# Patient Record
Sex: FEMALE
[De-identification: names, ages, dates, MRNs, and addresses within clinical notes are randomized per-mention and may not be internally consistent; named-entity substitution may affect disease eponyms.]

---

## 2018-10-03 ENCOUNTER — HOSPITAL ENCOUNTER (INPATIENT)
Dept: HOSPITAL 42 - ED | Age: 28
LOS: 2 days | Discharge: HOME | DRG: 552 | End: 2018-10-05
Attending: INTERNAL MEDICINE | Admitting: INTERNAL MEDICINE
Payer: COMMERCIAL

## 2018-10-03 VITALS — BODY MASS INDEX: 23.3 KG/M2

## 2018-10-03 DIAGNOSIS — F41.0: ICD-10-CM

## 2018-10-03 DIAGNOSIS — F17.210: ICD-10-CM

## 2018-10-03 DIAGNOSIS — F32.9: ICD-10-CM

## 2018-10-03 DIAGNOSIS — R65.10: ICD-10-CM

## 2018-10-03 DIAGNOSIS — Z87.442: ICD-10-CM

## 2018-10-03 DIAGNOSIS — M54.32: ICD-10-CM

## 2018-10-03 DIAGNOSIS — F12.90: ICD-10-CM

## 2018-10-03 DIAGNOSIS — Z87.440: ICD-10-CM

## 2018-10-03 DIAGNOSIS — M51.27: Primary | ICD-10-CM

## 2018-10-03 DIAGNOSIS — N28.1: ICD-10-CM

## 2018-10-03 DIAGNOSIS — F41.1: ICD-10-CM

## 2018-10-03 DIAGNOSIS — Z82.0: ICD-10-CM

## 2018-10-03 LAB
ALBUMIN SERPL-MCNC: 4.9 G/DL (ref 3–4.8)
ALBUMIN/GLOB SERPL: 1.8 {RATIO} (ref 1.1–1.8)
ALT SERPL-CCNC: 32 U/L (ref 7–56)
APAP SERPL-MCNC: < 10 UG/ML (ref 10–20)
APPEARANCE UR: (no result)
AST SERPL-CCNC: 44 U/L (ref 14–36)
BACTERIA #/AREA URNS HPF: (no result) /[HPF]
BASE EXCESS BLDV CALC-SCNC: -1.4 MMOL/L (ref 0–2)
BASOPHILS # BLD AUTO: 0.02 K/MM3 (ref 0–2)
BASOPHILS NFR BLD: 0.1 % (ref 0–3)
BILIRUB UR-MCNC: NEGATIVE MG/DL
BUN SERPL-MCNC: 16 MG/DL (ref 7–21)
CALCIUM SERPL-MCNC: 9 MG/DL (ref 8.4–10.5)
COLOR UR: YELLOW
EOSINOPHIL # BLD: 0 10*3/UL (ref 0–0.7)
EOSINOPHIL NFR BLD: 0.1 % (ref 1.5–5)
ERYTHROCYTE [DISTWIDTH] IN BLOOD BY AUTOMATED COUNT: 12.7 % (ref 11.5–14.5)
GFR NON-AFRICAN AMERICAN: > 60
GLUCOSE UR STRIP-MCNC: 250 MG/DL
GRANULOCYTES # BLD: 25.99 10*3/UL (ref 1.4–6.5)
GRANULOCYTES NFR BLD: 82.4 % (ref 50–68)
HGB BLD-MCNC: 13.1 G/DL (ref 12–16)
LEUKOCYTE ESTERASE UR-ACNC: NEGATIVE LEU/UL
LYMPHOCYTE: 13 % (ref 22–35)
LYMPHOCYTES # BLD: 2.7 10*3/UL (ref 1.2–3.4)
LYMPHOCYTES NFR BLD AUTO: 8.4 % (ref 22–35)
MCH RBC QN AUTO: 29.6 PG (ref 25–35)
MCHC RBC AUTO-ENTMCNC: 34.7 G/DL (ref 31–37)
MCV RBC AUTO: 85.3 FL (ref 80–105)
MONOCYTE: 3 % (ref 1–6)
MONOCYTES # BLD AUTO: 2.8 10*3/UL (ref 0.1–0.6)
MONOCYTES NFR BLD: 9 % (ref 1–6)
NEUTROPHILS NFR BLD AUTO: 84 % (ref 50–70)
PH BLDV: 7.29 [PH] (ref 7.32–7.43)
PH UR STRIP: 6 [PH] (ref 4.7–8)
PLATELET # BLD EST: NORMAL 10*3/UL
PLATELET # BLD: 359 10^3/UL (ref 120–450)
PMV BLD AUTO: 10 FL (ref 7–11)
PROT UR STRIP-MCNC: 30 MG/DL
RBC # BLD AUTO: 4.43 10^6/UL (ref 3.5–6.1)
RBC # UR STRIP: NEGATIVE /UL
RBC #/AREA URNS HPF: NEGATIVE /HPF (ref 0–2)
SALICYLATES SERPL-MCNC: < 1 MG/DL (ref 2–20)
SP GR UR STRIP: >= 1.03 (ref 1–1.03)
UROBILINOGEN UR STRIP-ACNC: 0.2 E.U./DL
VENOUS BLOOD FIO2: 21 %
VENOUS BLOOD GAS PCO2: 54 (ref 40–60)
VENOUS BLOOD GAS PO2: 95 MM/HG (ref 30–55)
WBC # BLD AUTO: 31.5 10^3/UL (ref 4.5–11)
WBC #/AREA URNS HPF: (no result) /HPF (ref 0–6)

## 2018-10-03 RX ADMIN — AZTREONAM SCH MLS/HR: 1 INJECTION, SOLUTION INTRAVENOUS at 22:27

## 2018-10-03 RX ADMIN — MORPHINE SULFATE PRN MG: 2 INJECTION, SOLUTION INTRAMUSCULAR; INTRAVENOUS at 21:14

## 2018-10-03 NOTE — ED PDOC
Arrival/HPI





- General


Chief Complaint: Lower Extremity Problem/Injury


Time Seen by Provider: 10/03/18 15:17


Historian: Patient





- History of Present Illness


Narrative History of Present Illness (Text): 





10/03/18 19:33


28 year old female presents today with left leg numbness and weakness. Patient 

states she was getting her children a bath and suddenly developed a numbness 

sensation in both legs. Patient states she was unable to ambulate and called out

of the room and then realized that the numbness and weakness was only in the 

left leg. Patient states he then had an anxiety attack and states that she 

developed chest pain and feeling extremely anxious and became nauseous. Patient 

states she has a history of anxiety and these are similar symptoms related to 

her anxiety. Patient states in the emergency room she is feeling better. Patient

states she is able to move her leg. Patient denies any fevers or chills. She 

denies abdominal pain. She denies any trauma or injury. Patient states she's 

been having low back pain for the past 2 months with pain radiating into the 

left leg. Patient states she's been seen at other hospitals for similar 

symptoms. Patient denies a history of recent fever. She denies history of sick 

contacts. Patient states she was recently treated for urinary tract infection 

with an unknown antibiotics about a week ago. Patient states since then she's 

been having no urinary symptoms. She patient states she urinated prior to 

arrival. Patient states she has been taking Percocet for her back pain as well 

as occasionally using marijuana. Patient denies ever using any IV drugs patient 

states she was feeling fine until the left leg became numb and weak. Patient 

denies headache dizziness or weakness. Denies any other complaints. 


Time/Duration: Prior to Arrival





Past Medical History





- Provider Review


Nursing Documentation Reviewed: Yes





- Travel History


Have you recently traveled outside [place] w/in the past 3 mons?: No





- Past History


Past History: No Previous





- Infectious Disease


Hx of Infectious Diseases: None





- Tetanus Immunization


Tetanus Immunization: Unknown





- Past Medical History


Past Medical History: No Previous





- Cardiac


Hx Pacemaker: No





- Pulmonary


Hx Respiratory Disorders: No





- Neurological


Hx Neurological Disorder: No





- HEENT


Hx HEENT Disorder: No (WEARS RX GLASSES)





- Renal


Hx Renal Disorder: Yes


Hx Kidney Stones: Yes





- Endocrine/Metabolic


Hx Endocrine Disorders: No





- Hematological/Oncological


Hx Blood Transfusions: No


Hx Blood Transfusion Reaction: No





- Integumentary


Hx Dermatological Disorder: No





- Musculoskeletal/Rheumatological


Hx Musculoskeletal Disorders: No





- Gastrointestinal


Hx Gastrointestinal Disorders: No





- Genitourinary/Gynecological


Hx Genitourinary Disorders: No ( X 1)


Other/Comment:  17 MONTHS AGO





- Psychiatric


Hx Substance Use: No





- Past Surgical History


Past Surgical History: No Previous





- Surgical History


Hx Appendectomy: Yes





- Anesthesia


Hx Anesthesia Reactions: No


Hx Malignant Hyperthermia: No





- Suicidal Assessment


Feels Threatened In Home Enviroment: No





Family/Social History





- Physician Review


Nursing Documentation Reviewed: Yes


Family/Social History: Unknown Family HX


Smoking Status: Current Some Days Smoker


Hx Alcohol Use: Yes


Hx Substance Use: No


Hx Substance Use Treatment: No





Allergies/Home Meds


Allergies/Adverse Reactions: 


Allergies





cefaclor [From Ceclor] Allergy (Verified 10/03/18 14:46)


   RASH








Home Medications: 


                                    Home Meds











 Medication  Instructions  Recorded  Confirmed


 


No Known Home Med  11/07/15 11/07/15














Review of Systems





- Review of Systems


Constitutional: absent: Fatigue, Fevers


Respiratory: absent: SOB, Cough


Cardiovascular: Chest Pain.  absent: Palpitations


Gastrointestinal: Nausea.  absent: Abdominal Pain, Constipation, Diarrhea, 

Vomiting


Genitourinary Female: absent: Dysuria, Frequency, Hematuria, Urine Output 

Changes


Musculoskeletal: Arthralgias, Back Pain.  absent: Neck Pain


Skin: absent: Rash, Pruritis


Neurological: absent: Headache, Dizziness


Psychiatric: Anxiety.  absent: Depression, Suicidal Ideation





Physical Exam


Vital Signs Reviewed: Yes





Vital Signs











  Temp Pulse Resp BP Pulse Ox


 


 10/03/18 14:40  97.5 F L  115 H  20  113/69  99











Temperature: Afebrile


Blood Pressure: Normal


Pulse: Tachycardic


Respiratory Rate: Normal


Appearance: Positive for: Well-Appearing, Non-Toxic, Comfortable


Pain Distress: None


Mental Status: Positive for: Alert and Oriented X 3, other (anxious)





- Systems Exam


Head: Present: Atraumatic


Mouth: Present: Moist Mucous Membranes


Neck: Present: Normal Range of Motion


Respiratory/Chest: Present: Clear to Auscultation, Good Air Exchange.  No: 

Respiratory Distress, Accessory Muscle Use


Cardiovascular: Present: Normal S1, S2, Tachycardic.  No: Murmurs


Abdomen: No: Tenderness, Distention, Rebound, Guarding


Rectal: Present: Normal Rectal Tone.  No: Occult Blood, Rectal Tenderness, Gross

Blood, Melena, Hemorrhoids, Fissures


Back: Present: Normal Inspection, Other (+ ttp over left sciatic foramen).  No: 

Midline Tenderness, Paraspinal Tenderness


Upper Extremity: Present: Normal Inspection, Normal ROM


Lower Extremity: Present: NORMAL PULSES, Capillary Refill < 2 s, Other (+ 

decreased sensation in entire left leg.  decreased strength in left leg. ).  No:

CALF TENDERNESS, Tenderness, Swelling, Erythema, Temperature Abnormalties, 

Neurovascularly Intact


Neurological: Present: GCS=15, Speech Normal.  No: Normal Sensory Function


Skin: Present: Warm, Dry


Psychiatric: Present: Alert, Oriented x 3





Medical Decision Making


ED Course and Treatment: 


10/3/18: 


28yr old female presenting with left leg numbness. moving left slight. slightly 

decreased strength. hx of back pain x 2 months. 








rectal exam; good tone, no gross blood. No saddle paresthesias.








Patient with inability to urinate in the emergency room. Straight 

catheterization ordered.





10/03/18 15:57


patient was given xanax po; within 2 minutes patient vomited. 





Patient was seen and evaluated by Dr. Tolliver. 





MRI Of lumbar spine ordered. 








beta hcg; negative


cbc; wbc:31


cmp; wnl





UA; wnl








cxr; wnl





lactate; wnl 





blood and urine cultures pending





ct abd/pelvis: FINDINGS: 


LUNG BASES: 


The lung bases appear clear. No pleural effusions are seen. 


LIVER: 


Mild periportal edema and pericholecystic fluid. , may represent fluid overload 

versus hepatitis. 


GALLBLADDER AND BILE DUCTS: 


However if there is clinical concern for cholecystitis, consider correlation 

with right upper quadrant ultrasound or hepatobiliary scan. 


PANCREAS: 


Unremarkable. 


SPLEEN: 


Unremarkable. 


ADRENAL GLANDS: 


2.3 cm low density nodule is noted in the right adrenal gland consistent with an

adenoma. 


KIDNEYS, URETERS, AND BLADDER: 


Several small bilateral renal cysts are present. 


The kidneys are otherwise normal. 


STOMACH AND BOWEL: 


Unremarkable appearance of the stomach and bowel. No evidence of bowel 

obstruction. No evidence suggesting enteritis or colitis. 


APPENDIX: 


Status post appendectomy. 


PERITONEUM: 


No free fluid. No free air. 


LYMPH NODES: 


No lymphadenopathy is evident. 


REPRODUCTIVE: 


Uterus and ovaries are unremarkable. 


VASCULATURE: 


No evidence of abdominal aortic aneurysm. 


BONES: 


No aggressive appearing osseous lesion. No acute osseous pathology evident. 


IMPRESSION: 


1>. Mild periportal edema and pericholecystic fluid. , may represent fluid 

overload versus hepatitis. 


2. However if there is clinical concern for cholecystitis, consider correlation 

with right upper quadrant ultrasound or hepatobiliary scan. 


3. 2.3 cm low density nodule is noted in the right adrenal gland consistent with

an adenoma. 


4. Several small bilateral renal cysts are present.








Patient with leukocytosis and left leg numbness inability to urinate in the 

emergency room. Concern for cauda equina versus epidural abscess. Will cover 

with vancomycin and aztreonam IV.





pt reassesment; pt feeling better in ER: vitals improving. still c/o numbness in

left leg. no abdominal pain. anxiety symptoms resolved. 








Case was discussed with Dr. Gongora. He would like the patient to be admitted to 

the hospitalist service.





case was discussed with dr. Velasquez neurologist; she advised MRI of lumbar/sacral 

spine with IV contrast.








case discussed with the intensivist Dr. white who accepts admission to the ICU. 

case also discussed with dr. Gomez. 





MRI is currently pending.








All results discussed in depth with the patient and her .








impression; leukocytosis, left leg numbness, left leg weakness, back pain


Admit to ICU











- RAD Interpretation


Radiology Orders: 











10/03/18 15:19


CHEST PORTABLE [RAD] Stat 














- Medication Orders


Current Medication Orders: 














Discontinued Medications





Alprazolam (Xanax)  0.5 mg PO STAT STA; Protocol


   Stop: 10/03/18 15:25


   Last Admin: 10/03/18 15:41  Dose: 0.5 mg











Disposition/Present on Arrival





- Present on Arrival


Any Indicators Present on Arrival: No


History of DVT/PE: No


History of Uncontrolled Diabetes: No


Urinary Catheter: No


History of Decub. Ulcer: No


History Surgical Site Infection Following: None





- Disposition


Have Diagnosis and Disposition been Completed?: Yes


Diagnosis: 


 Leukocytosis, Left leg numbness, Back pain





Disposition: HOSPITALIZED


Disposition Time: 19:00


Patient Plan: Admission, ICU


Patient Problems: 


                             Current Active Problems











Problem Status Onset


 


Back pain Acute 


 


Left leg numbness Acute 


 


Leukocytosis Acute 











Condition: CRITICAL

## 2018-10-03 NOTE — CP.PCM.HP
<Frances Meléndez - Last Filed: 10/05/18 06:39>





History of Present Illness





- History of Present Illness


History of Present Illness: 





rFances Meléndez, PGY2, H&P for Dr Gomez:





CC: left leg numbness





28 year old female with PMH anxiety, presents for left leg numbness and weakness

that started this afternoon. Patient states that she was giving her children a 

bath, when she felt that her left leg "gave out," became numb and weak, and she 

got on the floor. Then she felt that she became very anxious, may have passed 

out (for maybe a minute), then woke up, but was unable to move her left leg. 

Patient also reports that she has been having left sided sciatica pain for past 

3 years since her pregnancy. However, it has gotten worse for past month, 

patient was supposed to get physical therapy for it. Reports one episode of 

nausea, nonbilious vomiting during the episode, also reports ringing in her ears

for some time. Denies fevers, chills, dizziness, shaking body movements, 

urinary/bowel incontinence, tongue biting, chest pain, sob, cough, dysuria, 

hematuria, neck pain, headaches. Patient reports urinary retention since the 

event this afternoon. However, she just urinated in ED currently. 





In ED, patient afebrile, hemodynamically stable. WBC count 31.5, lactate normal,

CMP unremarkable, UA neg for infection. UDS pos for cannabinoids.





12 point ROS obtained and negative, except as per HPI.





Past medical history: denies


Surgical history: Appendectomy, kidney stones


All: Cefaclor (as a child) patient has taken amoxicillin before.


FH: Mother, MS, fibromyalgia


Social history: Lives with . Smokes 5-6 ciggs/day for 8 years. Uses 

marijuana every other day. 


PMD: [facility]








Present on Admission





- Present on Admission


Any Indicators Present on Admission: No


History of DVT/PE: No


History of Uncontrolled Diabetes: No


Urinary Catheter: No


Decubitus Ulcer Present: No





Review of Systems





- Review of Systems


All systems: reviewed and no additional remarkable complaints except


Review of Systems: 





as per HPI





Past Patient History





- Infectious Disease


Hx of Infectious Diseases: None





- Tetanus Immunizations


Tetanus Immunization: Unknown





- Past Social History


Smoking Status: Current Some Days Smoker





- CARDIAC


Hx Pacemaker: No





- PULMONARY


Hx Respiratory Disorders: No





- NEUROLOGICAL


Hx Neurological Disorder: No





- HEENT


Hx HEENT Problems: No (WEARS RX GLASSES)





- RENAL


Hx Chronic Kidney Disease: Yes


Hx Kidney Stones: Yes





- ENDOCRINE/METABOLIC


Hx Endocrine Disorders: No





- HEMATOLOGICAL/ONCOLOGICAL


Hx Blood Transfusions: No


Hx Blood Transfusion Reaction: No





- INTEGUMENTARY


Hx Dermatological Problems: No





- MUSCULOSKELETAL/RHEUMATOLOGICAL


Hx Musculoskeletal Disorders: No





- GASTROINTESTINAL


Hx Gastrointestinal Disorders: No





- GENITOURINARY/GYNECOLOGICAL


Hx Genitourinary Disorders: No ( X 1)


Other/Comment:  17 MONTHS AGO





- PSYCHIATRIC


Hx Substance Use: No





- SURGICAL HISTORY


Hx Appendectomy: Yes





- ANESTHESIA


Hx Anesthesia Reactions: No


Hx Malignant Hyperthermia: No





Meds


Home Medications: 


                              Home Medication List











 Medication  Instructions  Recorded  Confirmed  Type


 


Acetaminophen [Tylenol 325mg tab] 650 mg PO Q6H PRN  tab 10/05/18  Rx


 


Cyclobenzaprine [Flexeril] 5 mg PO Q8 #10 tab 10/05/18  Rx


 


Dexamethasone [Decadron] 2 mg PO BID #4 tab 10/05/18  Rx











Allergies/Adverse Reactions: 


                                    Allergies











Allergy/AdvReac Type Severity Reaction Status Date / Time


 


cefaclor [From Ceclor] Allergy  RASH Verified 10/03/18 14:46














Physical Exam





- Constitutional


Appears: Non-toxic, No Acute Distress





- Head Exam


Head Exam: ATRAUMATIC, NORMOCEPHALIC





- Eye Exam


Eye Exam: EOMI, PERRL.  absent: Conjunctival injection, Nystagmus, Scleral 

icterus


Pupil Exam: NORMAL ACCOMODATION, PERRL.  absent: Irregular, Miosis, Mydriatic, 

Unequal





- ENT Exam


ENT Exam: Mucous Membranes Moist





- Neck Exam


Neck exam: Positive for: Full Rom





- Respiratory Exam


Respiratory Exam: Clear to Auscultation Bilateral, NORMAL BREATHING PATTERN.  

absent: Accessory Muscle Use, Chest Wall Tenderness, Prolonged Expiratory Phase,

Rales, Rhonchi, Wheezes, Respiratory Distress, Stridor





- Cardiovascular Exam


Cardiovascular Exam: RRR, +S1, +S2.  absent: Systolic Murmur





- GI/Abdominal Exam


GI & Abdominal Exam: Normal Bowel Sounds, Soft.  absent: Distended, Firm, 

Guarding, Organomegaly, Pulsatile Mass, Rebound, Rigid, Tenderness





- Extremities Exam


Extremities exam: Positive for: normal inspection.  Negative for: calf 

tenderness, pedal edema





- Back Exam


Back exam: muscle spasm (on left sided buttock area, with sciatica like pain), 

NORMAL INSPECTION.  absent: CVA tenderness (L), CVA tenderness (R)





- Neurological Exam


Neurological exam: Alert, CN II-XII Intact, Oriented x3, Reflexes Normal


Additional comments: 





Sensation to fine touch, pinprick intact throughout.


Motor strength: RUE, RLE, LUE 5/5. LLE 4/5.


No dysmetria.


Did not assess gait as patient complaining that "she will not be able to stand 

up"





- Psychiatric Exam


Psychiatric exam: Anxious





- Skin


Skin Exam: Dry, Normal Color, Warm





Results





- Vital Signs


Recent Vital Signs: 





                                Last Vital Signs











Temp  97.5 F L  10/03/18 14:40


 


Pulse  109 H  10/03/18 19:44


 


Resp  18   10/03/18 19:44


 


BP  122/68   10/03/18 19:44


 


Pulse Ox  99   10/03/18 19:44














- Labs


Result Diagrams: 


                                 10/03/18 15:45





                                 10/03/18 15:45


Labs: 





                         Laboratory Results - last 24 hr











  10/03/18 10/03/18 10/03/18





  15:45 15:45 15:45


 


WBC  31.5 H*  


 


RBC  4.43  


 


Hgb  13.1  


 


Hct  37.8  


 


MCV  85.3  


 


MCH  29.6  


 


MCHC  34.7  


 


RDW  12.7  


 


Plt Count  359  


 


MPV  10.0  


 


Gran %  82.4 H  


 


Lymph % (Auto)  8.4 L  


 


Mono % (Auto)  9.0 H  


 


Eos % (Auto)  0.1 L  


 


Baso % (Auto)  0.1  


 


Gran #  25.99 H  


 


Lymph # (Auto)  2.7  


 


Mono # (Auto)  2.8 H  


 


Eos # (Auto)  0.0  


 


Baso # (Auto)  0.02  


 


Neutrophils % (Manual)  84 H  


 


Lymphocytes % (Manual)  13 L  


 


Monocytes % (Manual)  3  


 


Platelet Evaluation  Normal  


 


pO2   


 


VBG pH   


 


VBG pCO2   


 


VBG HCO3   


 


VBG Total CO2   


 


VBG O2 Sat (Calc)   


 


VBG Base Excess   


 


VBG Potassium   


 


Glucose   


 


Lactate   


 


FiO2   


 


Sodium   141 


 


Potassium   3.7 


 


Chloride   104 


 


Carbon Dioxide   26 


 


Anion Gap   15 


 


BUN   16 


 


Creatinine   0.7 


 


Est GFR ( Amer)   > 60 


 


Est GFR (Non-Af Amer)   > 60 


 


Random Glucose   73 


 


Calcium   9.0 


 


Total Bilirubin   0.2 


 


AST   44 H 


 


ALT   32 


 


Alkaline Phosphatase   55 


 


Total Protein   7.7 


 


Albumin   4.9 H 


 


Globulin   2.8 


 


Albumin/Globulin Ratio   1.8 


 


Beta HCG, Quant   


 


Venous Blood Potassium   


 


Urine Color   


 


Urine Appearance   


 


Urine pH   


 


Ur Specific Gravity   


 


Urine Protein   


 


Urine Glucose (UA)   


 


Urine Ketones   


 


Urine Blood   


 


Urine Nitrate   


 


Urine Bilirubin   


 


Urine Urobilinogen   


 


Ur Leukocyte Esterase   


 


Urine RBC   


 


Urine WBC   


 


Urine Bacteria   


 


Salicylates    < 1 L


 


Urine Opiates Screen   


 


Urine Methadone Screen   


 


Acetaminophen    < 10.0 L


 


Ur Barbiturates Screen   


 


Ur Phencyclidine Scrn   


 


Ur Amphetamines Screen   


 


U Benzodiazepines Scrn   


 


U Oth Cocaine Metabols   


 


U Cannabinoids Screen   


 


Alcohol, Quantitative   














  10/03/18 10/03/18 10/03/18





  15:45 15:45 16:40


 


WBC   


 


RBC   


 


Hgb   


 


Hct   


 


MCV   


 


MCH   


 


MCHC   


 


RDW   


 


Plt Count   


 


MPV   


 


Gran %   


 


Lymph % (Auto)   


 


Mono % (Auto)   


 


Eos % (Auto)   


 


Baso % (Auto)   


 


Gran #   


 


Lymph # (Auto)   


 


Mono # (Auto)   


 


Eos # (Auto)   


 


Baso # (Auto)   


 


Neutrophils % (Manual)   


 


Lymphocytes % (Manual)   


 


Monocytes % (Manual)   


 


Platelet Evaluation   


 


pO2    95 H


 


VBG pH    7.29 L


 


VBG pCO2    54.0


 


VBG HCO3    26.0


 


VBG Total CO2    27.7


 


VBG O2 Sat (Calc)    99.2 H


 


VBG Base Excess    -1.4 L


 


VBG Potassium    3.5 L


 


Glucose    71


 


Lactate    1.1


 


FiO2    21.0


 


Sodium    141.0


 


Potassium   


 


Chloride    108.0 H


 


Carbon Dioxide   


 


Anion Gap   


 


BUN   


 


Creatinine   


 


Est GFR ( Amer)   


 


Est GFR (Non-Af Amer)   


 


Random Glucose   


 


Calcium   


 


Total Bilirubin   


 


AST   


 


ALT   


 


Alkaline Phosphatase   


 


Total Protein   


 


Albumin   


 


Globulin   


 


Albumin/Globulin Ratio   


 


Beta HCG, Quant   < 2.39 


 


Venous Blood Potassium    3.5 L


 


Urine Color   


 


Urine Appearance   


 


Urine pH   


 


Ur Specific Gravity   


 


Urine Protein   


 


Urine Glucose (UA)   


 


Urine Ketones   


 


Urine Blood   


 


Urine Nitrate   


 


Urine Bilirubin   


 


Urine Urobilinogen   


 


Ur Leukocyte Esterase   


 


Urine RBC   


 


Urine WBC   


 


Urine Bacteria   


 


Salicylates   


 


Urine Opiates Screen   


 


Urine Methadone Screen   


 


Acetaminophen   


 


Ur Barbiturates Screen   


 


Ur Phencyclidine Scrn   


 


Ur Amphetamines Screen   


 


U Benzodiazepines Scrn   


 


U Oth Cocaine Metabols   


 


U Cannabinoids Screen   


 


Alcohol, Quantitative  < 10  














  10/03/18 10/03/18





  16:55 16:55


 


WBC  


 


RBC  


 


Hgb  


 


Hct  


 


MCV  


 


MCH  


 


MCHC  


 


RDW  


 


Plt Count  


 


MPV  


 


Gran %  


 


Lymph % (Auto)  


 


Mono % (Auto)  


 


Eos % (Auto)  


 


Baso % (Auto)  


 


Gran #  


 


Lymph # (Auto)  


 


Mono # (Auto)  


 


Eos # (Auto)  


 


Baso # (Auto)  


 


Neutrophils % (Manual)  


 


Lymphocytes % (Manual)  


 


Monocytes % (Manual)  


 


Platelet Evaluation  


 


pO2  


 


VBG pH  


 


VBG pCO2  


 


VBG HCO3  


 


VBG Total CO2  


 


VBG O2 Sat (Calc)  


 


VBG Base Excess  


 


VBG Potassium  


 


Glucose  


 


Lactate  


 


FiO2  


 


Sodium  


 


Potassium  


 


Chloride  


 


Carbon Dioxide  


 


Anion Gap  


 


BUN  


 


Creatinine  


 


Est GFR ( Amer)  


 


Est GFR (Non-Af Amer)  


 


Random Glucose  


 


Calcium  


 


Total Bilirubin  


 


AST  


 


ALT  


 


Alkaline Phosphatase  


 


Total Protein  


 


Albumin  


 


Globulin  


 


Albumin/Globulin Ratio  


 


Beta HCG, Quant  


 


Venous Blood Potassium  


 


Urine Color  Yellow 


 


Urine Appearance  Sl cloudy 


 


Urine pH  6.0 


 


Ur Specific Gravity  >= 1.030 


 


Urine Protein  30 H 


 


Urine Glucose (UA)  250 H 


 


Urine Ketones  Trace H 


 


Urine Blood  Negative 


 


Urine Nitrate  Negative 


 


Urine Bilirubin  Negative 


 


Urine Urobilinogen  0.2 


 


Ur Leukocyte Esterase  Negative 


 


Urine RBC  Negative 


 


Urine WBC  0 - 2 


 


Urine Bacteria  Many 


 


Salicylates  


 


Urine Opiates Screen   Negative


 


Urine Methadone Screen   Negative


 


Acetaminophen  


 


Ur Barbiturates Screen   Negative


 


Ur Phencyclidine Scrn   Negative


 


Ur Amphetamines Screen   Negative


 


U Benzodiazepines Scrn   Negative


 


U Oth Cocaine Metabols   Negative


 


U Cannabinoids Screen   Positive H


 


Alcohol, Quantitative  














Assessment & Plan





- Assessment and Plan (Free Text)


Assessment: 





28 year old female with no significant PMH, presents for left leg 

numbness/weakness, concerning for cauda equina syndrome:








Left leg numbness/weakness:


2/2 psychogenic vs cauda equina vs MS vs sciatica pain


- Lumbar spine MRI pending.


- Patient is able to move her left leg, strength LLE 4/5. RLE 5/5. Patient 

urinated in ED. 


- Decadron 10 mg IV given


- Neurology consulted. F/u recs.


- CT abd pelvis: Mild periportal edema and pericholecystic fluid. may represent 

fluid overload versus hepatitis. However if there is clinical concern for 

cholecystitis, consider correlation with right upper quadrant ultrasound or 

hepatobiliary scan. 2.3 cm low density nodule is noted in the right adrenal 

gland consistent with an adenoma. Several small bilateral renal cysts are 

present.


- Neuro checks


- Given Vanc and Azactam in ED. C.w abx


- ID consulted. F/u recs.


- Pancultured. f.u results. procal.


- pain control - percocet, morphine for breakthrough pain


- Physical therapy once ruled out cauda equina


- monitor





Hx of anxiety:


- consider the above symptoms manifestation of a panic attack.


- xanax prn


- Consider psych consult.





PPX: Pepcid, SCDs





Case seen and discussed with Dr Gomez.





<Chadwick Gomez - Last Filed: 10/05/18 19:07>





Results





- Vital Signs


Recent Vital Signs: 





                                Last Vital Signs











Temp  98.2 F   10/04/18 22:53


 


Pulse  76   10/04/18 22:53


 


Resp  18   10/04/18 22:53


 


BP  109/70   10/04/18 22:53


 


Pulse Ox  97   10/04/18 22:53














- Labs


Result Diagrams: 


                                 10/05/18 06:00





                                 10/05/18 06:00


Labs: 





                         Laboratory Results - last 24 hr











  10/03/18 10/04/18 10/04/18





  15:45 06:00 06:00


 


WBC    9.4  D


 


RBC    4.12


 


Hgb    12.0


 


Hct    35.6 L


 


MCV    86.4


 


MCH    29.1


 


MCHC    33.7


 


RDW    12.7


 


Plt Count    264


 


MPV    10.2


 


Gran %    85.7 H


 


Lymph % (Auto)    12.6 L


 


Mono % (Auto)    1.7


 


Eos % (Auto)    0.0 L


 


Baso % (Auto)    0.0


 


Gran #    8.05 H


 


Lymph # (Auto)    1.2


 


Mono # (Auto)    0.2


 


Eos # (Auto)    0.0


 


Baso # (Auto)    0.00


 


ESR   


 


Sodium   


 


Potassium   


 


Chloride   


 


Carbon Dioxide   


 


Anion Gap   


 


BUN   


 


Creatinine   


 


Est GFR ( Amer)   


 


Est GFR (Non-Af Amer)   


 


Random Glucose   


 


Hemoglobin A1c  5.2  


 


Calcium   


 


Phosphorus   


 


Magnesium   


 


Total Bilirubin   


 


AST   


 


ALT   


 


Alkaline Phosphatase   


 


C-Reactive Protein   


 


Total Protein   


 


Albumin   


 


Globulin   


 


Albumin/Globulin Ratio   


 


Procalcitonin   0.55 H 


 


Hepatitis A IgM Ab   


 


Hep Bs Antigen   


 


Hep B Core IgM Ab   


 


Hepatitis C Antibody   


 


HIV 1&2 Ag/Ab, 4th Gen   














  10/04/18 10/04/18 10/04/18





  06:00 12:10 12:10


 


WBC   


 


RBC   


 


Hgb   


 


Hct   


 


MCV   


 


MCH   


 


MCHC   


 


RDW   


 


Plt Count   


 


MPV   


 


Gran %   


 


Lymph % (Auto)   


 


Mono % (Auto)   


 


Eos % (Auto)   


 


Baso % (Auto)   


 


Gran #   


 


Lymph # (Auto)   


 


Mono # (Auto)   


 


Eos # (Auto)   


 


Baso # (Auto)   


 


ESR   


 


Sodium  136  


 


Potassium  4.6  


 


Chloride  106  


 


Carbon Dioxide  21  


 


Anion Gap  14  


 


BUN  10  


 


Creatinine  0.6 L  


 


Est GFR ( Amer)  > 60  


 


Est GFR (Non-Af Amer)  > 60  


 


Random Glucose  91  


 


Hemoglobin A1c   


 


Calcium  8.8  


 


Phosphorus  3.2  


 


Magnesium  1.9  


 


Total Bilirubin  0.3  


 


AST  50 H  


 


ALT  34  


 


Alkaline Phosphatase  44  


 


C-Reactive Protein   9.90 


 


Total Protein  6.2  


 


Albumin  3.8  


 


Globulin  2.4  


 


Albumin/Globulin Ratio  1.6  


 


Procalcitonin   


 


Hepatitis A IgM Ab   


 


Hep Bs Antigen   


 


Hep B Core IgM Ab   


 


Hepatitis C Antibody   


 


HIV 1&2 Ag/Ab, 4th Gen    Nonreactive














  10/04/18 10/04/18 10/04/18





  12:10 12:10 12:10


 


WBC   


 


RBC   


 


Hgb   


 


Hct   


 


MCV   


 


MCH   


 


MCHC   


 


RDW   


 


Plt Count   


 


MPV   


 


Gran %   


 


Lymph % (Auto)   


 


Mono % (Auto)   


 


Eos % (Auto)   


 


Baso % (Auto)   


 


Gran #   


 


Lymph # (Auto)   


 


Mono # (Auto)   


 


Eos # (Auto)   


 


Baso # (Auto)   


 


ESR  3  


 


Sodium   


 


Potassium   


 


Chloride   


 


Carbon Dioxide   


 


Anion Gap   


 


BUN   


 


Creatinine   


 


Est GFR ( Amer)   


 


Est GFR (Non-Af Amer)   


 


Random Glucose   


 


Hemoglobin A1c    5.2


 


Calcium   


 


Phosphorus   


 


Magnesium   


 


Total Bilirubin   


 


AST   


 


ALT   


 


Alkaline Phosphatase   


 


C-Reactive Protein   


 


Total Protein   


 


Albumin   


 


Globulin   


 


Albumin/Globulin Ratio   


 


Procalcitonin   


 


Hepatitis A IgM Ab   Negative 


 


Hep Bs Antigen   Negative 


 


Hep B Core IgM Ab   Negative 


 


Hepatitis C Antibody   Negative 


 


HIV 1&2 Ag/Ab, 4th Gen   














  10/05/18





  06:00


 


WBC  11.9 H D


 


RBC  4.14


 


Hgb  12.1


 


Hct  35.5 L


 


MCV  85.7


 


MCH  29.2


 


MCHC  34.1


 


RDW  12.7


 


Plt Count  260


 


MPV  10.6


 


Gran %  83.1 H


 


Lymph % (Auto)  11.8 L


 


Mono % (Auto)  5.1


 


Eos % (Auto)  0.0 L


 


Baso % (Auto)  0.0


 


Gran #  9.91 H


 


Lymph # (Auto)  1.4


 


Mono # (Auto)  0.6


 


Eos # (Auto)  0.0


 


Baso # (Auto)  0.00


 


ESR 


 


Sodium 


 


Potassium 


 


Chloride 


 


Carbon Dioxide 


 


Anion Gap 


 


BUN 


 


Creatinine 


 


Est GFR ( Amer) 


 


Est GFR (Non-Af Amer) 


 


Random Glucose 


 


Hemoglobin A1c 


 


Calcium 


 


Phosphorus 


 


Magnesium 


 


Total Bilirubin 


 


AST 


 


ALT 


 


Alkaline Phosphatase 


 


C-Reactive Protein 


 


Total Protein 


 


Albumin 


 


Globulin 


 


Albumin/Globulin Ratio 


 


Procalcitonin 


 


Hepatitis A IgM Ab 


 


Hep Bs Antigen 


 


Hep B Core IgM Ab 


 


Hepatitis C Antibody 


 


HIV 1&2 Ag/Ab, 4th Gen 














Attending/Attestation





- Attestation


I have personally seen and examined this patient.: Yes


I have fully participated in the care of the patient.: Yes


I have reviewed all pertinent clinical information: Yes

## 2018-10-04 LAB
ALBUMIN SERPL-MCNC: 3.8 G/DL (ref 3–4.8)
ALBUMIN/GLOB SERPL: 1.6 {RATIO} (ref 1.1–1.8)
ALT SERPL-CCNC: 34 U/L (ref 7–56)
AST SERPL-CCNC: 50 U/L (ref 14–36)
BASOPHILS # BLD AUTO: 0 K/MM3 (ref 0–2)
BASOPHILS NFR BLD: 0 % (ref 0–3)
BUN SERPL-MCNC: 10 MG/DL (ref 7–21)
CALCIUM SERPL-MCNC: 8.8 MG/DL (ref 8.4–10.5)
EOSINOPHIL # BLD: 0 10*3/UL (ref 0–0.7)
EOSINOPHIL NFR BLD: 0 % (ref 1.5–5)
ERYTHROCYTE [DISTWIDTH] IN BLOOD BY AUTOMATED COUNT: 12.7 % (ref 11.5–14.5)
GFR NON-AFRICAN AMERICAN: > 60
GRANULOCYTES # BLD: 8.05 10*3/UL (ref 1.4–6.5)
GRANULOCYTES NFR BLD: 85.7 % (ref 50–68)
HEPATITIS A IGM: NEGATIVE
HEPATITIS B CORE AB: NEGATIVE
HEPATITIS C ANTIBODY: NEGATIVE
HGB BLD-MCNC: 12 G/DL (ref 12–16)
LYMPHOCYTES # BLD: 1.2 10*3/UL (ref 1.2–3.4)
LYMPHOCYTES NFR BLD AUTO: 12.6 % (ref 22–35)
MCH RBC QN AUTO: 29.1 PG (ref 25–35)
MCHC RBC AUTO-ENTMCNC: 33.7 G/DL (ref 31–37)
MCV RBC AUTO: 86.4 FL (ref 80–105)
MONOCYTES # BLD AUTO: 0.2 10*3/UL (ref 0.1–0.6)
MONOCYTES NFR BLD: 1.7 % (ref 1–6)
PLATELET # BLD: 264 10^3/UL (ref 120–450)
PMV BLD AUTO: 10.2 FL (ref 7–11)
RBC # BLD AUTO: 4.12 10^6/UL (ref 3.5–6.1)
WBC # BLD AUTO: 9.4 10^3/UL (ref 4.5–11)

## 2018-10-04 RX ADMIN — VANCOMYCIN HYDROCHLORIDE SCH MLS/HR: 1 INJECTION, POWDER, LYOPHILIZED, FOR SOLUTION INTRAVENOUS at 21:41

## 2018-10-04 RX ADMIN — VANCOMYCIN HYDROCHLORIDE SCH MLS/HR: 1 INJECTION, POWDER, LYOPHILIZED, FOR SOLUTION INTRAVENOUS at 10:35

## 2018-10-04 RX ADMIN — AZTREONAM SCH MLS/HR: 1 INJECTION, SOLUTION INTRAVENOUS at 05:06

## 2018-10-04 RX ADMIN — VANCOMYCIN HYDROCHLORIDE SCH: 1 INJECTION, POWDER, LYOPHILIZED, FOR SOLUTION INTRAVENOUS at 07:22

## 2018-10-04 RX ADMIN — MORPHINE SULFATE PRN MG: 2 INJECTION, SOLUTION INTRAMUSCULAR; INTRAVENOUS at 05:03

## 2018-10-04 NOTE — CP.[email or web address].CON
History of Present Illness





- History of Present Illness


History of Present Illness: 





Neurology consult dictated. 


Plan: 


1. MRI brain with contrast


2. Physical therapy. 





Dr. laurent 





Past Patient History





- Infectious Disease


Hx of Infectious Diseases: None





- Tetanus Immunizations


Tetanus Immunization: Unknown





- Past Social History


Smoking Status: Current Some Days Smoker





- CARDIAC


Hx Pacemaker: No





- PULMONARY


Hx Respiratory Disorders: No





- NEUROLOGICAL


Hx Neurological Disorder: No





- HEENT


Hx HEENT Problems: No (WEARS RX GLASSES)





- RENAL


Hx Chronic Kidney Disease: Yes


Hx Kidney Stones: Yes





- ENDOCRINE/METABOLIC


Hx Endocrine Disorders: No





- HEMATOLOGICAL/ONCOLOGICAL


Hx Blood Transfusions: No


Hx Blood Transfusion Reaction: No





- INTEGUMENTARY


Hx Dermatological Problems: No





- MUSCULOSKELETAL/RHEUMATOLOGICAL


Hx Musculoskeletal Disorders: No





- GASTROINTESTINAL


Hx Gastrointestinal Disorders: No





- GENITOURINARY/GYNECOLOGICAL


Hx Genitourinary Disorders: No ( X 1)


Other/Comment:  17 MONTHS AGO





- PSYCHIATRIC


Hx Substance Use: No





- SURGICAL HISTORY


Hx Appendectomy: Yes





- ANESTHESIA


Hx Anesthesia Reactions: No


Hx Malignant Hyperthermia: No





Meds


Allergies/Adverse Reactions: 


                                    Allergies











Allergy/AdvReac Type Severity Reaction Status Date / Time


 


cefaclor [From [facility]] Allergy  RASH Verified 10/03/18 14:46














- Medications


Medications: 


                               Current Medications





Acetaminophen (Tylenol 325mg Tab)  650 mg PO Q6H PRN


   PRN Reason: Pain, Mild (1-3)


   Last Admin: 10/04/18 10:34 Dose:  650 mg


Alprazolam (Xanax)  0.5 mg PO BID PRN; Protocol


   PRN Reason: Anxiety


Cyclobenzaprine HCl (Flexeril)  5 mg PO TID PRN


   PRN Reason: Muscle spasm


Dexamethasone (Decadron)  4 mg PO Q8 MUNA


Famotidine (Pepcid)  20 mg PO 1000,2200 [facility]


   Last Admin: 10/04/18 10:34 Dose:  20 mg


Vancomycin HCl (Vancomycin 1gm)  1 gm in 250 mls @ 167 mls/hr IVPB Q12H [facility]; 

Protocol


   Last Admin: 10/04/18 10:35 Dose:  167 mls/hr











Results





- Vital Signs


Recent Vital Signs: 


                                Last Vital Signs











Temp  97.6 F   10/04/18 04:00


 


Pulse  80   10/04/18 07:00


 


Resp  22   10/04/18 06:20


 


BP  106/61   10/04/18 06:00


 


Pulse Ox  98   10/04/18 06:20














- Labs


Result Diagrams: 


                                 10/04/18 06:00





                                 10/04/18 06:00


Labs: 


                         Laboratory Results - last 24 hr











  10/03/18 10/03/18 10/03/18





  15:45 15:45 15:45


 


WBC  31.5 H*  


 


RBC  4.43  


 


Hgb  13.1  


 


Hct  37.8  


 


MCV  85.3  


 


MCH  29.6  


 


MCHC  34.7  


 


RDW  12.7  


 


Plt Count  359  


 


MPV  10.0  


 


Gran %  82.4 H  


 


Lymph % (Auto)  8.4 L  


 


Mono % (Auto)  9.0 H  


 


Eos % (Auto)  0.1 L  


 


Baso % (Auto)  0.1  


 


Gran #  25.99 H  


 


Lymph # (Auto)  2.7  


 


Mono # (Auto)  2.8 H  


 


Eos # (Auto)  0.0  


 


Baso # (Auto)  0.02  


 


Neutrophils % (Manual)  84 H  


 


Lymphocytes % (Manual)  13 L  


 


Monocytes % (Manual)  3  


 


Platelet Evaluation  Normal  


 


ESR   


 


pO2   


 


VBG pH   


 


VBG pCO2   


 


VBG HCO3   


 


VBG Total CO2   


 


VBG O2 Sat (Calc)   


 


VBG Base Excess   


 


VBG Potassium   


 


Glucose   


 


Lactate   


 


FiO2   


 


Sodium   141 


 


Potassium   3.7 


 


Chloride   104 


 


Carbon Dioxide   26 


 


Anion Gap   15 


 


BUN   16 


 


Creatinine   0.7 


 


Est GFR ( Amer)   > 60 


 


Est GFR (Non-Af Amer)   > 60 


 


Random Glucose   73 


 


Hemoglobin A1c   


 


Calcium   9.0 


 


Phosphorus   


 


Magnesium   


 


Total Bilirubin   0.2 


 


AST   44 H 


 


ALT   32 


 


Alkaline Phosphatase   55 


 


Total Protein   7.7 


 


Albumin   4.9 H 


 


Globulin   2.8 


 


Albumin/Globulin Ratio   1.8 


 


Procalcitonin   


 


Beta HCG, Quant   


 


Venous Blood Potassium   


 


Urine Color   


 


Urine Appearance   


 


Urine pH   


 


Ur Specific Gravity   


 


Urine Protein   


 


Urine Glucose (UA)   


 


Urine Ketones   


 


Urine Blood   


 


Urine Nitrate   


 


Urine Bilirubin   


 


Urine Urobilinogen   


 


Ur Leukocyte Esterase   


 


Urine RBC   


 


Urine WBC   


 


Urine Bacteria   


 


Salicylates    < 1 L


 


Urine Opiates Screen   


 


Urine Methadone Screen   


 


Acetaminophen    < 10.0 L


 


Ur Barbiturates Screen   


 


Ur Phencyclidine Scrn   


 


Ur Amphetamines Screen   


 


U Benzodiazepines Scrn   


 


U Oth Cocaine Metabols   


 


U Cannabinoids Screen   


 


Alcohol, Quantitative   














  10/03/18 10/03/18 10/03/18





  15:45 15:45 15:45


 


WBC   


 


RBC   


 


Hgb   


 


Hct   


 


MCV   


 


MCH   


 


MCHC   


 


RDW   


 


Plt Count   


 


MPV   


 


Gran %   


 


Lymph % (Auto)   


 


Mono % (Auto)   


 


Eos % (Auto)   


 


Baso % (Auto)   


 


Gran #   


 


Lymph # (Auto)   


 


Mono # (Auto)   


 


Eos # (Auto)   


 


Baso # (Auto)   


 


Neutrophils % (Manual)   


 


Lymphocytes % (Manual)   


 


Monocytes % (Manual)   


 


Platelet Evaluation   


 


ESR   


 


pO2   


 


VBG pH   


 


VBG pCO2   


 


VBG HCO3   


 


VBG Total CO2   


 


VBG O2 Sat (Calc)   


 


VBG Base Excess   


 


VBG Potassium   


 


Glucose   


 


Lactate   


 


FiO2   


 


Sodium   


 


Potassium   


 


Chloride   


 


Carbon Dioxide   


 


Anion Gap   


 


BUN   


 


Creatinine   


 


Est GFR ( Amer)   


 


Est GFR (Non-Af Amer)   


 


Random Glucose   


 


Hemoglobin A1c    5.2


 


Calcium   


 


Phosphorus   


 


Magnesium   


 


Total Bilirubin   


 


AST   


 


ALT   


 


Alkaline Phosphatase   


 


Total Protein   


 


Albumin   


 


Globulin   


 


Albumin/Globulin Ratio   


 


Procalcitonin   


 


Beta HCG, Quant   < 2.39 


 


Venous Blood Potassium   


 


Urine Color   


 


Urine Appearance   


 


Urine pH   


 


Ur Specific Gravity   


 


Urine Protein   


 


Urine Glucose (UA)   


 


Urine Ketones   


 


Urine Blood   


 


Urine Nitrate   


 


Urine Bilirubin   


 


Urine Urobilinogen   


 


Ur Leukocyte Esterase   


 


Urine RBC   


 


Urine WBC   


 


Urine Bacteria   


 


Salicylates   


 


Urine Opiates Screen   


 


Urine Methadone Screen   


 


Acetaminophen   


 


Ur Barbiturates Screen   


 


Ur Phencyclidine Scrn   


 


Ur Amphetamines Screen   


 


U Benzodiazepines Scrn   


 


U Oth Cocaine Metabols   


 


U Cannabinoids Screen   


 


Alcohol, Quantitative  < 10  














  10/03/18 10/03/18 10/03/18





  16:40 16:55 16:55


 


WBC   


 


RBC   


 


Hgb   


 


Hct   


 


MCV   


 


MCH   


 


MCHC   


 


RDW   


 


Plt Count   


 


MPV   


 


Gran %   


 


Lymph % (Auto)   


 


Mono % (Auto)   


 


Eos % (Auto)   


 


Baso % (Auto)   


 


Gran #   


 


Lymph # (Auto)   


 


Mono # (Auto)   


 


Eos # (Auto)   


 


Baso # (Auto)   


 


Neutrophils % (Manual)   


 


Lymphocytes % (Manual)   


 


Monocytes % (Manual)   


 


Platelet Evaluation   


 


ESR   


 


pO2  95 H  


 


VBG pH  7.29 L  


 


VBG pCO2  54.0  


 


VBG HCO3  26.0  


 


VBG Total CO2  27.7  


 


VBG O2 Sat (Calc)  99.2 H  


 


VBG Base Excess  -1.4 L  


 


VBG Potassium  3.5 L  


 


Glucose  71  


 


Lactate  1.1  


 


FiO2  21.0  


 


Sodium  141.0  


 


Potassium   


 


Chloride  108.0 H  


 


Carbon Dioxide   


 


Anion Gap   


 


BUN   


 


Creatinine   


 


Est GFR ( Amer)   


 


Est GFR (Non-Af Amer)   


 


Random Glucose   


 


Hemoglobin A1c   


 


Calcium   


 


Phosphorus   


 


Magnesium   


 


Total Bilirubin   


 


AST   


 


ALT   


 


Alkaline Phosphatase   


 


Total Protein   


 


Albumin   


 


Globulin   


 


Albumin/Globulin Ratio   


 


Procalcitonin   


 


Beta HCG, Quant   


 


Venous Blood Potassium  3.5 L  


 


Urine Color   Yellow 


 


Urine Appearance   Sl cloudy 


 


Urine pH   6.0 


 


Ur Specific Gravity   >= 1.030 


 


Urine Protein   30 H 


 


Urine Glucose (UA)   250 H 


 


Urine Ketones   Trace H 


 


Urine Blood   Negative 


 


Urine Nitrate   Negative 


 


Urine Bilirubin   Negative 


 


Urine Urobilinogen   0.2 


 


Ur Leukocyte Esterase   Negative 


 


Urine RBC   Negative 


 


Urine WBC   0 - 2 


 


Urine Bacteria   Many 


 


Salicylates   


 


Urine Opiates Screen    Negative


 


Urine Methadone Screen    Negative


 


Acetaminophen   


 


Ur Barbiturates Screen    Negative


 


Ur Phencyclidine Scrn    Negative


 


Ur Amphetamines Screen    Negative


 


U Benzodiazepines Scrn    Negative


 


U Oth Cocaine Metabols    Negative


 


U Cannabinoids Screen    Positive H


 


Alcohol, Quantitative   














  10/04/18 10/04/18 10/04/18





  06:00 06:00 06:00


 


WBC   9.4  D 


 


RBC   4.12 


 


Hgb   12.0 


 


Hct   35.6 L 


 


MCV   86.4 


 


MCH   29.1 


 


MCHC   33.7 


 


RDW   12.7 


 


Plt Count   264 


 


MPV   10.2 


 


Gran %   85.7 H 


 


Lymph % (Auto)   12.6 L 


 


Mono % (Auto)   1.7 


 


Eos % (Auto)   0.0 L 


 


Baso % (Auto)   0.0 


 


Gran #   8.05 H 


 


Lymph # (Auto)   1.2 


 


Mono # (Auto)   0.2 


 


Eos # (Auto)   0.0 


 


Baso # (Auto)   0.00 


 


Neutrophils % (Manual)   


 


Lymphocytes % (Manual)   


 


Monocytes % (Manual)   


 


Platelet Evaluation   


 


ESR   


 


pO2   


 


VBG pH   


 


VBG pCO2   


 


VBG HCO3   


 


VBG Total CO2   


 


VBG O2 Sat (Calc)   


 


VBG Base Excess   


 


VBG Potassium   


 


Glucose   


 


Lactate   


 


FiO2   


 


Sodium    136


 


Potassium    4.6


 


Chloride    106


 


Carbon Dioxide    21


 


Anion Gap    14


 


BUN    10


 


Creatinine    0.6 L


 


Est GFR ( Amer)    > 60


 


Est GFR (Non-Af Amer)    > 60


 


Random Glucose    91


 


Hemoglobin A1c   


 


Calcium    8.8


 


Phosphorus    3.2


 


Magnesium    1.9


 


Total Bilirubin    0.3


 


AST    50 H


 


ALT    34


 


Alkaline Phosphatase    44


 


Total Protein    6.2


 


Albumin    3.8


 


Globulin    2.4


 


Albumin/Globulin Ratio    1.6


 


Procalcitonin  0.55 H  


 


Beta HCG, Quant   


 


Venous Blood Potassium   


 


Urine Color   


 


Urine Appearance   


 


Urine pH   


 


Ur Specific Gravity   


 


Urine Protein   


 


Urine Glucose (UA)   


 


Urine Ketones   


 


Urine Blood   


 


Urine Nitrate   


 


Urine Bilirubin   


 


Urine Urobilinogen   


 


Ur Leukocyte Esterase   


 


Urine RBC   


 


Urine WBC   


 


Urine Bacteria   


 


Salicylates   


 


Urine Opiates Screen   


 


Urine Methadone Screen   


 


Acetaminophen   


 


Ur Barbiturates Screen   


 


Ur Phencyclidine Scrn   


 


Ur Amphetamines Screen   


 


U Benzodiazepines Scrn   


 


U Oth Cocaine Metabols   


 


U Cannabinoids Screen   


 


Alcohol, Quantitative   














  10/04/18





  12:10


 


WBC 


 


RBC 


 


Hgb 


 


Hct 


 


MCV 


 


MCH 


 


MCHC 


 


RDW 


 


Plt Count 


 


MPV 


 


Gran % 


 


Lymph % (Auto) 


 


Mono % (Auto) 


 


Eos % (Auto) 


 


Baso % (Auto) 


 


Gran # 


 


Lymph # (Auto) 


 


Mono # (Auto) 


 


Eos # (Auto) 


 


Baso # (Auto) 


 


Neutrophils % (Manual) 


 


Lymphocytes % (Manual) 


 


Monocytes % (Manual) 


 


Platelet Evaluation 


 


ESR  3


 


pO2 


 


VBG pH 


 


VBG pCO2 


 


VBG HCO3 


 


VBG Total CO2 


 


VBG O2 Sat (Calc) 


 


VBG Base Excess 


 


VBG Potassium 


 


Glucose 


 


Lactate 


 


FiO2 


 


Sodium 


 


Potassium 


 


Chloride 


 


Carbon Dioxide 


 


Anion Gap 


 


BUN 


 


Creatinine 


 


Est GFR ( Amer) 


 


Est GFR (Non-Af Amer) 


 


Random Glucose 


 


Hemoglobin A1c 


 


Calcium 


 


Phosphorus 


 


Magnesium 


 


Total Bilirubin 


 


AST 


 


ALT 


 


Alkaline Phosphatase 


 


Total Protein 


 


Albumin 


 


Globulin 


 


Albumin/Globulin Ratio 


 


Procalcitonin 


 


Beta HCG, Quant 


 


Venous Blood Potassium 


 


Urine Color 


 


Urine Appearance 


 


Urine pH 


 


Ur Specific Gravity 


 


Urine Protein 


 


Urine Glucose (UA) 


 


Urine Ketones 


 


Urine Blood 


 


Urine Nitrate 


 


Urine Bilirubin 


 


Urine Urobilinogen 


 


Ur Leukocyte Esterase 


 


Urine RBC 


 


Urine WBC 


 


Urine Bacteria 


 


Salicylates 


 


Urine Opiates Screen 


 


Urine Methadone Screen 


 


Acetaminophen 


 


Ur Barbiturates Screen 


 


Ur Phencyclidine Scrn 


 


Ur Amphetamines Screen 


 


U Benzodiazepines Scrn 


 


U Oth Cocaine Metabols 


 


U Cannabinoids Screen 


 


Alcohol, Quantitative

## 2018-10-04 NOTE — CARD
--------------- APPROVED REPORT --------------





Date of service: 10/03/2018



EKG Measurement

Heart [id number]

[id number]

[id number]

[id number]

[id number]



<Conclusion>

Sinus tachycardia

Nonspecific ST and T wave abnormality

Abnormal ECG

## 2018-10-04 NOTE — CP.CCUPN
<Chris Walters - Last Filed: 10/04/18 12:32>





CCU Subjective





- Physician Review


Subjective (Free Text): 


Chris Walters, PGY-1, CCU Progress Note for Dr. Cheng





Patient seen and evaluated at bedside. Patient had no overnight events. Patient 

reported improvement in left leg weakness but still reports numbness and pins 

and needles sensation. Patient denies headache, dizziness, chest pain, heart 

palpitations, shortness of breath, nausea, vomiting, constipation, diarrhea, 

dysuria, hematuria.








Critical Care Time Spent (in minutes): 60





CCU Objective





- Vital Signs / Intake & Output


Vital Signs (Last 4 hours): 


Vital Signs











  Pulse Resp BP Pulse Ox


 


 10/04/18 06:20  79  22   98


 


 10/04/18 06:10  63  36 H   98


 


 10/04/18 06:00  101 H   106/61  99


 


 10/04/18 05:50  64  18   99


 


 10/04/18 05:40  65  19   99


 


 10/04/18 05:30  63  27 H   98


 


 10/04/18 05:20  67    98


 


 10/04/18 05:10  72  72 H   99


 


 10/04/18 05:00  62  14  112/55 L  99


 


 10/04/18 04:50  77    99


 


 10/04/18 04:40  97 H    99


 


 10/04/18 04:30  60  47 H   98











Intake and Output (Last 8hrs): 


                                 Intake & Output











 10/03/18 10/04/18 10/04/18





 22:59 06:59 14:59


 


Intake Total  750 


 


Output Total  450 


 


Balance  300 


 


Weight 136 lb 136 lb 


 


Intake:   


 


  IV  450 


 


    Left Antecubital  450 


 


  Oral  300 


 


Output:   


 


  Urine  450 


 


    Urine, Voided  450 














- Physical Exam


Head: Positive for: Atraumatic, Normocephalic


Pupils: Positive for: PERRL


Extroacular Muscles: Positive for: EOMI


Conjunctiva: Positive for: Normal


Mouth: Positive for: Moist Mucous Membranes


Pharnyx: Positive for: Normal


Neck: Positive for: Normal Range of Motion


Respiratory/Chest: Positive for: Clear to Auscultation, Good Air Exchange.  

Negative for: Respiratory Distress, Accessory Muscle Use


Cardiovascular: Positive for: Normal S1, S2, Tachycardic.  Negative for: Murmurs


Abdomen: Negative for: Tenderness, Distention, Rebound, Guarding


Rectal: Positive for: Normal Rectal Tone.  Negative for: Occult Blood, Rectal 

Tenderness, Gross Blood, Melena, Hemorrhoids, Fissures


Back: Positive for: Normal Inspection, Other (+ ttp over left sciatic foramen). 

Negative for: Midline Tenderness, Paraspinal Tenderness


Upper Extremity: Positive for: Normal Inspection, Normal ROM


Lower Extremity: Positive for: NORMAL PULSES, Capillary Refill < 2 s, Other (+ 

decreased sensation in entire left leg.  +4/5 MSK in left leg).  Negative for: 

CALF TENDERNESS, Tenderness, Swelling, Erythema, Temperature Abnormalties, 

Neurovascularly Intact


Neurological: Positive for: GCS=15, Speech Normal.  Negative for: Normal Sensory

Function


Skin: Positive for: Warm, Dry


Psychiatric: Positive for: Alert, Oriented x 3





- Medications


Active Medications: 


Active Medications











Generic Name Dose Route Start Last Admin





  Trade Name Freq  PRN Reason Stop Dose Admin


 


Acetaminophen  650 mg  10/04/18 07:21  





  Tylenol 325mg Tab  PO   





  Q6H PRN   





  Pain, Mild (1-3)   





     





     





     


 


Alprazolam  0.5 mg  10/04/18 03:45  





  Xanax  PO   





  BID PRN   





  Anxiety   





     





  Protocol   





     


 


Famotidine  20 mg  10/04/18 10:00  





  Pepcid  PO   





  1000,2200 [facility]   





     





     





     





     


 


Vancomycin HCl  1 gm in 250 mls @ 167 mls/hr  10/03/18 20:30  10/04/18 07:22





  Vancomycin 1gm  IVPB   Not Given





  Q12H [facility]   





     





     





  Protocol   





     


 


Oxycodone/Acetaminophen  1 tab  10/03/18 20:24  





  Percocet 5/325 Mg Tab  PO  10/06/18 20:25  





  Q6H PRN   





  Pain, moderate (4-7)   





     





     





     














- Patient Studies


Lab Studies: 


                                   Lab Studies











  10/04/18 10/04/18 10/03/18 Range/Units





  06:00 06:00 16:55 


 


WBC   9.4  D   (4.5-11.0)  10^3/ul


 


RBC   4.12   (3.5-6.1)  10^6/uL


 


Hgb   12.0   (12.0-16.0)  g/dL


 


Hct   35.6 L   (36.0-48.0)  %


 


MCV   86.4   (80.0-105.0)  fl


 


MCH   29.1   (25.0-35.0)  pg


 


MCHC   33.7   (31.0-37.0)  g/dl


 


RDW   12.7   (11.5-14.5)  %


 


Plt Count   264   (120.0-450.0)  10^3/uL


 


MPV   10.2   (7.0-11.0)  fl


 


Gran %   85.7 H   (50.0-68.0)  %


 


Lymph % (Auto)   12.6 L   (22.0-35.0)  %


 


Mono % (Auto)   1.7   (1.0-6.0)  %


 


Eos % (Auto)   0.0 L   (1.5-5.0)  %


 


Baso % (Auto)   0.0   (0.0-3.0)  %


 


Gran #   8.05 H   (1.4-6.5)  


 


Lymph # (Auto)   1.2   (1.2-3.4)  


 


Mono # (Auto)   0.2   (0.1-0.6)  


 


Eos # (Auto)   0.0   (0.0-0.7)  


 


Baso # (Auto)   0.00   (0.0-2.0)  K/mm3


 


Neutrophils % (Manual)     (50.0-70.0)  %


 


Lymphocytes % (Manual)     (22.0-35.0)  %


 


Monocytes % (Manual)     (1.0-6.0)  %


 


Platelet Evaluation     (NORMAL)  


 


pO2     (30-55)  mm/Hg


 


VBG pH     (7.32-7.43)  


 


VBG pCO2     (40-60)  


 


VBG HCO3     (21-28)  mmol/l


 


VBG Total CO2     (22-28)  mmol.L


 


VBG O2 Sat (Calc)     (40-65)  %


 


VBG Base Excess     (0.0-2.0)  mmol/L


 


VBG Potassium     (3.6-5.2)  mmol/L


 


Glucose     ()  mg/dl


 


Lactate     (0.7-2.1)  mmol/L


 


FiO2     %


 


Sodium  136    (132-148)  mmol/L


 


Potassium  4.6    (3.6-5.0)  mmol/L


 


Chloride  106    ()  mmol/L


 


Carbon Dioxide  21    (21-33)  mmol/L


 


Anion Gap  14    (10-20)  


 


BUN  10    (7-21)  mg/dL


 


Creatinine  0.6 L    (0.7-1.2)  mg/dl


 


Est GFR (African Amer)  > 60    


 


Est GFR (Non-Af Amer)  > 60    


 


Random Glucose  91    ()  mg/dL


 


Calcium  8.8    (8.4-10.5)  mg/dL


 


Phosphorus  3.2    (2.5-4.5)  mg/dL


 


Magnesium  1.9    (1.7-2.2)  mg/dL


 


Total Bilirubin  0.3    (0.2-1.3)  mg/dL


 


AST  50 H    (14-36)  U/L


 


ALT  34    (7-56)  U/L


 


Alkaline Phosphatase  44    ()  U/L


 


Total Protein  6.2    (5.8-8.3)  g/dL


 


Albumin  3.8    (3.0-4.8)  g/dL


 


Globulin  2.4    gm/dL


 


Albumin/Globulin Ratio  1.6    (1.1-1.8)  


 


Beta HCG, Quant     (0-6.15)  mIU/mL


 


Venous Blood Potassium     (3.6-5.2)  mmol/L


 


Urine Color     (YELLOW)  


 


Urine Appearance     (CLEAR)  


 


Urine pH     (4.7-8.0)  


 


Ur Specific Gravity     (1.005-1.035)  


 


Urine Protein     (<30 mg/dL)  mg/dL


 


Urine Glucose (UA)     (NEGATIVE)  mg/dL


 


Urine Ketones     (NEGATIVE)  mg/dL


 


Urine Blood     (NEGATIVE)  


 


Urine Nitrate     (NEGATIVE)  


 


Urine Bilirubin     (NEGATIVE)  


 


Urine Urobilinogen     (<1 E.U./dL)  E.U./dL


 


Ur Leukocyte Esterase     (NEGATIVE)  Evangelina/uL


 


Urine RBC     (0-2)  /hpf


 


Urine WBC     (0-6)  /hpf


 


Urine Bacteria     (NEG)  


 


Salicylates     (2.0-20.0)  mg/dL


 


Urine Opiates Screen    Negative  (NEGATIVE)  


 


Urine Methadone Screen    Negative  (NEGATIVE)  


 


Acetaminophen     (10.0-20.0)  ug/ml


 


Ur Barbiturates Screen    Negative  (NEGATIVE)  


 


Ur Phencyclidine Scrn    Negative  (NEGATIVE)  


 


Ur Amphetamines Screen    Negative  (NEGATIVE)  


 


U Benzodiazepines Scrn    Negative  (NEGATIVE)  


 


U Oth Cocaine Metabols    Negative  (NEGATIVE)  


 


U Cannabinoids Screen    Positive H  (NEGATIVE)  


 


Alcohol, Quantitative     (0-10)  mg/dL














  10/03/18 10/03/18 10/03/18 Range/Units





  16:55 16:40 15:45 


 


WBC     (4.5-11.0)  10^3/ul


 


RBC     (3.5-6.1)  10^6/uL


 


Hgb     (12.0-16.0)  g/dL


 


Hct     (36.0-48.0)  %


 


MCV     (80.0-105.0)  fl


 


MCH     (25.0-35.0)  pg


 


MCHC     (31.0-37.0)  g/dl


 


RDW     (11.5-14.5)  %


 


Plt Count     (120.0-450.0)  10^3/uL


 


MPV     (7.0-11.0)  fl


 


Gran %     (50.0-68.0)  %


 


Lymph % (Auto)     (22.0-35.0)  %


 


Mono % (Auto)     (1.0-6.0)  %


 


Eos % (Auto)     (1.5-5.0)  %


 


Baso % (Auto)     (0.0-3.0)  %


 


Gran #     (1.4-6.5)  


 


Lymph # (Auto)     (1.2-3.4)  


 


Mono # (Auto)     (0.1-0.6)  


 


Eos # (Auto)     (0.0-0.7)  


 


Baso # (Auto)     (0.0-2.0)  K/mm3


 


Neutrophils % (Manual)     (50.0-70.0)  %


 


Lymphocytes % (Manual)     (22.0-35.0)  %


 


Monocytes % (Manual)     (1.0-6.0)  %


 


Platelet Evaluation     (NORMAL)  


 


pO2   95 H   (30-55)  mm/Hg


 


VBG pH   7.29 L   (7.32-7.43)  


 


VBG pCO2   54.0   (40-60)  


 


VBG HCO3   26.0   (21-28)  mmol/l


 


VBG Total CO2   27.7   (22-28)  mmol.L


 


VBG O2 Sat (Calc)   99.2 H   (40-65)  %


 


VBG Base Excess   -1.4 L   (0.0-2.0)  mmol/L


 


VBG Potassium   3.5 L   (3.6-5.2)  mmol/L


 


Glucose   71   ()  mg/dl


 


Lactate   1.1   (0.7-2.1)  mmol/L


 


FiO2   21.0   %


 


Sodium   141.0   (132-148)  mmol/L


 


Potassium     (3.6-5.0)  mmol/L


 


Chloride   108.0 H   ()  mmol/L


 


Carbon Dioxide     (21-33)  mmol/L


 


Anion Gap     (10-20)  


 


BUN     (7-21)  mg/dL


 


Creatinine     (0.7-1.2)  mg/dl


 


Est GFR (African Amer)     


 


Est GFR (Non-Af Amer)     


 


Random Glucose     ()  mg/dL


 


Calcium     (8.4-10.5)  mg/dL


 


Phosphorus     (2.5-4.5)  mg/dL


 


Magnesium     (1.7-2.2)  mg/dL


 


Total Bilirubin     (0.2-1.3)  mg/dL


 


AST     (14-36)  U/L


 


ALT     (7-56)  U/L


 


Alkaline Phosphatase     ()  U/L


 


Total Protein     (5.8-8.3)  g/dL


 


Albumin     (3.0-4.8)  g/dL


 


Globulin     gm/dL


 


Albumin/Globulin Ratio     (1.1-1.8)  


 


Beta HCG, Quant    < 2.39  (0-6.15)  mIU/mL


 


Venous Blood Potassium   3.5 L   (3.6-5.2)  mmol/L


 


Urine Color  Yellow    (YELLOW)  


 


Urine Appearance  Sl cloudy    (CLEAR)  


 


Urine pH  6.0    (4.7-8.0)  


 


Ur Specific Gravity  >= 1.030    (1.005-1.035)  


 


Urine Protein  30 H    (<30 mg/dL)  mg/dL


 


Urine Glucose (UA)  250 H    (NEGATIVE)  mg/dL


 


Urine Ketones  Trace H    (NEGATIVE)  mg/dL


 


Urine Blood  Negative    (NEGATIVE)  


 


Urine Nitrate  Negative    (NEGATIVE)  


 


Urine Bilirubin  Negative    (NEGATIVE)  


 


Urine Urobilinogen  0.2    (<1 E.U./dL)  E.U./dL


 


Ur Leukocyte Esterase  Negative    (NEGATIVE)  Evangelina/uL


 


Urine RBC  Negative    (0-2)  /hpf


 


Urine WBC  0 - 2    (0-6)  /hpf


 


Urine Bacteria  Many    (NEG)  


 


Salicylates     (2.0-20.0)  mg/dL


 


Urine Opiates Screen     (NEGATIVE)  


 


Urine Methadone Screen     (NEGATIVE)  


 


Acetaminophen     (10.0-20.0)  ug/ml


 


Ur Barbiturates Screen     (NEGATIVE)  


 


Ur Phencyclidine Scrn     (NEGATIVE)  


 


Ur Amphetamines Screen     (NEGATIVE)  


 


U Benzodiazepines Scrn     (NEGATIVE)  


 


U Oth Cocaine Metabols     (NEGATIVE)  


 


U Cannabinoids Screen     (NEGATIVE)  


 


Alcohol, Quantitative     (0-10)  mg/dL














  10/03/18 10/03/18 10/03/18 Range/Units





  15:45 15:45 15:45 


 


WBC     (4.5-11.0)  10^3/ul


 


RBC     (3.5-6.1)  10^6/uL


 


Hgb     (12.0-16.0)  g/dL


 


Hct     (36.0-48.0)  %


 


MCV     (80.0-105.0)  fl


 


MCH     (25.0-35.0)  pg


 


MCHC     (31.0-37.0)  g/dl


 


RDW     (11.5-14.5)  %


 


Plt Count     (120.0-450.0)  10^3/uL


 


MPV     (7.0-11.0)  fl


 


Gran %     (50.0-68.0)  %


 


Lymph % (Auto)     (22.0-35.0)  %


 


Mono % (Auto)     (1.0-6.0)  %


 


Eos % (Auto)     (1.5-5.0)  %


 


Baso % (Auto)     (0.0-3.0)  %


 


Gran #     (1.4-6.5)  


 


Lymph # (Auto)     (1.2-3.4)  


 


Mono # (Auto)     (0.1-0.6)  


 


Eos # (Auto)     (0.0-0.7)  


 


Baso # (Auto)     (0.0-2.0)  K/mm3


 


Neutrophils % (Manual)     (50.0-70.0)  %


 


Lymphocytes % (Manual)     (22.0-35.0)  %


 


Monocytes % (Manual)     (1.0-6.0)  %


 


Platelet Evaluation     (NORMAL)  


 


pO2     (30-55)  mm/Hg


 


VBG pH     (7.32-7.43)  


 


VBG pCO2     (40-60)  


 


VBG HCO3     (21-28)  mmol/l


 


VBG Total CO2     (22-28)  mmol.L


 


VBG O2 Sat (Calc)     (40-65)  %


 


VBG Base Excess     (0.0-2.0)  mmol/L


 


VBG Potassium     (3.6-5.2)  mmol/L


 


Glucose     ()  mg/dl


 


Lactate     (0.7-2.1)  mmol/L


 


FiO2     %


 


Sodium    141  (132-148)  mmol/L


 


Potassium    3.7  (3.6-5.0)  mmol/L


 


Chloride    104  ()  mmol/L


 


Carbon Dioxide    26  (21-33)  mmol/L


 


Anion Gap    15  (10-20)  


 


BUN    16  (7-21)  mg/dL


 


Creatinine    0.7  (0.7-1.2)  mg/dl


 


Est GFR (African Amer)    > 60  


 


Est GFR (Non-Af Amer)    > 60  


 


Random Glucose    73  ()  mg/dL


 


Calcium    9.0  (8.4-10.5)  mg/dL


 


Phosphorus     (2.5-4.5)  mg/dL


 


Magnesium     (1.7-2.2)  mg/dL


 


Total Bilirubin    0.2  (0.2-1.3)  mg/dL


 


AST    44 H  (14-36)  U/L


 


ALT    32  (7-56)  U/L


 


Alkaline Phosphatase    55  ()  U/L


 


Total Protein    7.7  (5.8-8.3)  g/dL


 


Albumin    4.9 H  (3.0-4.8)  g/dL


 


Globulin    2.8  gm/dL


 


Albumin/Globulin Ratio    1.8  (1.1-1.8)  


 


Beta HCG, Quant     (0-6.15)  mIU/mL


 


Venous Blood Potassium     (3.6-5.2)  mmol/L


 


Urine Color     (YELLOW)  


 


Urine Appearance     (CLEAR)  


 


Urine pH     (4.7-8.0)  


 


Ur Specific Gravity     (1.005-1.035)  


 


Urine Protein     (<30 mg/dL)  mg/dL


 


Urine Glucose (UA)     (NEGATIVE)  mg/dL


 


Urine Ketones     (NEGATIVE)  mg/dL


 


Urine Blood     (NEGATIVE)  


 


Urine Nitrate     (NEGATIVE)  


 


Urine Bilirubin     (NEGATIVE)  


 


Urine Urobilinogen     (<1 E.U./dL)  E.U./dL


 


Ur Leukocyte Esterase     (NEGATIVE)  Evangelina/uL


 


Urine RBC     (0-2)  /hpf


 


Urine WBC     (0-6)  /hpf


 


Urine Bacteria     (NEG)  


 


Salicylates   < 1 L   (2.0-20.0)  mg/dL


 


Urine Opiates Screen     (NEGATIVE)  


 


Urine Methadone Screen     (NEGATIVE)  


 


Acetaminophen   < 10.0 L   (10.0-20.0)  ug/ml


 


Ur Barbiturates Screen     (NEGATIVE)  


 


Ur Phencyclidine Scrn     (NEGATIVE)  


 


Ur Amphetamines Screen     (NEGATIVE)  


 


U Benzodiazepines Scrn     (NEGATIVE)  


 


U Oth Cocaine Metabols     (NEGATIVE)  


 


U Cannabinoids Screen     (NEGATIVE)  


 


Alcohol, Quantitative  < 10    (0-10)  mg/dL














  10/03/18 Range/Units





  15:45 


 


WBC  31.5 H*  (4.5-11.0)  10^3/ul


 


RBC  4.43  (3.5-6.1)  10^6/uL


 


Hgb  13.1  (12.0-16.0)  g/dL


 


Hct  37.8  (36.0-48.0)  %


 


MCV  85.3  (80.0-105.0)  fl


 


MCH  29.6  (25.0-35.0)  pg


 


MCHC  34.7  (31.0-37.0)  g/dl


 


RDW  12.7  (11.5-14.5)  %


 


Plt Count  359  (120.0-450.0)  10^3/uL


 


MPV  10.0  (7.0-11.0)  fl


 


Gran %  82.4 H  (50.0-68.0)  %


 


Lymph % (Auto)  8.4 L  (22.0-35.0)  %


 


Mono % (Auto)  9.0 H  (1.0-6.0)  %


 


Eos % (Auto)  0.1 L  (1.5-5.0)  %


 


Baso % (Auto)  0.1  (0.0-3.0)  %


 


Gran #  25.99 H  (1.4-6.5)  


 


Lymph # (Auto)  2.7  (1.2-3.4)  


 


Mono # (Auto)  2.8 H  (0.1-0.6)  


 


Eos # (Auto)  0.0  (0.0-0.7)  


 


Baso # (Auto)  0.02  (0.0-2.0)  K/mm3


 


Neutrophils % (Manual)  84 H  (50.0-70.0)  %


 


Lymphocytes % (Manual)  13 L  (22.0-35.0)  %


 


Monocytes % (Manual)  3  (1.0-6.0)  %


 


Platelet Evaluation  Normal  (NORMAL)  


 


pO2   (30-55)  mm/Hg


 


VBG pH   (7.32-7.43)  


 


VBG pCO2   (40-60)  


 


VBG HCO3   (21-28)  mmol/l


 


VBG Total CO2   (22-28)  mmol.L


 


VBG O2 Sat (Calc)   (40-65)  %


 


VBG Base Excess   (0.0-2.0)  mmol/L


 


VBG Potassium   (3.6-5.2)  mmol/L


 


Glucose   ()  mg/dl


 


Lactate   (0.7-2.1)  mmol/L


 


FiO2   %


 


Sodium   (132-148)  mmol/L


 


Potassium   (3.6-5.0)  mmol/L


 


Chloride   ()  mmol/L


 


Carbon Dioxide   (21-33)  mmol/L


 


Anion Gap   (10-20)  


 


BUN   (7-21)  mg/dL


 


Creatinine   (0.7-1.2)  mg/dl


 


Est GFR (African Amer)   


 


Est GFR (Non-Af Amer)   


 


Random Glucose   ()  mg/dL


 


Calcium   (8.4-10.5)  mg/dL


 


Phosphorus   (2.5-4.5)  mg/dL


 


Magnesium   (1.7-2.2)  mg/dL


 


Total Bilirubin   (0.2-1.3)  mg/dL


 


AST   (14-36)  U/L


 


ALT   (7-56)  U/L


 


Alkaline Phosphatase   ()  U/L


 


Total Protein   (5.8-8.3)  g/dL


 


Albumin   (3.0-4.8)  g/dL


 


Globulin   gm/dL


 


Albumin/Globulin Ratio   (1.1-1.8)  


 


Beta HCG, Quant   (0-6.15)  mIU/mL


 


Venous Blood Potassium   (3.6-5.2)  mmol/L


 


Urine Color   (YELLOW)  


 


Urine Appearance   (CLEAR)  


 


Urine pH   (4.7-8.0)  


 


Ur Specific Gravity   (1.005-1.035)  


 


Urine Protein   (<30 mg/dL)  mg/dL


 


Urine Glucose (UA)   (NEGATIVE)  mg/dL


 


Urine Ketones   (NEGATIVE)  mg/dL


 


Urine Blood   (NEGATIVE)  


 


Urine Nitrate   (NEGATIVE)  


 


Urine Bilirubin   (NEGATIVE)  


 


Urine Urobilinogen   (<1 E.U./dL)  E.U./dL


 


Ur Leukocyte Esterase   (NEGATIVE)  Evangelina/uL


 


Urine RBC   (0-2)  /hpf


 


Urine WBC   (0-6)  /hpf


 


Urine Bacteria   (NEG)  


 


Salicylates   (2.0-20.0)  mg/dL


 


Urine Opiates Screen   (NEGATIVE)  


 


Urine Methadone Screen   (NEGATIVE)  


 


Acetaminophen   (10.0-20.0)  ug/ml


 


Ur Barbiturates Screen   (NEGATIVE)  


 


Ur Phencyclidine Scrn   (NEGATIVE)  


 


Ur Amphetamines Screen   (NEGATIVE)  


 


U Benzodiazepines Scrn   (NEGATIVE)  


 


U Oth Cocaine Metabols   (NEGATIVE)  


 


U Cannabinoids Screen   (NEGATIVE)  


 


Alcohol, Quantitative   (0-10)  mg/dL








                         Laboratory Results - last 24 hr











  10/03/18 10/03/18 10/03/18





  15:45 15:45 15:45


 


WBC  31.5 H*  


 


RBC  4.43  


 


Hgb  13.1  


 


Hct  37.8  


 


MCV  85.3  


 


MCH  29.6  


 


MCHC  34.7  


 


RDW  12.7  


 


Plt Count  359  


 


MPV  10.0  


 


Gran %  82.4 H  


 


Lymph % (Auto)  8.4 L  


 


Mono % (Auto)  9.0 H  


 


Eos % (Auto)  0.1 L  


 


Baso % (Auto)  0.1  


 


Gran #  25.99 H  


 


Lymph # (Auto)  2.7  


 


Mono # (Auto)  2.8 H  


 


Eos # (Auto)  0.0  


 


Baso # (Auto)  0.02  


 


Neutrophils % (Manual)  84 H  


 


Lymphocytes % (Manual)  13 L  


 


Monocytes % (Manual)  3  


 


Platelet Evaluation  Normal  


 


pO2   


 


VBG pH   


 


VBG pCO2   


 


VBG HCO3   


 


VBG Total CO2   


 


VBG O2 Sat (Calc)   


 


VBG Base Excess   


 


VBG Potassium   


 


Glucose   


 


Lactate   


 


FiO2   


 


Sodium   141 


 


Potassium   3.7 


 


Chloride   104 


 


Carbon Dioxide   26 


 


Anion Gap   15 


 


BUN   16 


 


Creatinine   0.7 


 


Est GFR ( Amer)   > 60 


 


Est GFR (Non-Af Amer)   > 60 


 


Random Glucose   73 


 


Calcium   9.0 


 


Phosphorus   


 


Magnesium   


 


Total Bilirubin   0.2 


 


AST   44 H 


 


ALT   32 


 


Alkaline Phosphatase   55 


 


Total Protein   7.7 


 


Albumin   4.9 H 


 


Globulin   2.8 


 


Albumin/Globulin Ratio   1.8 


 


Beta HCG, Quant   


 


Venous Blood Potassium   


 


Urine Color   


 


Urine Appearance   


 


Urine pH   


 


Ur Specific Gravity   


 


Urine Protein   


 


Urine Glucose (UA)   


 


Urine Ketones   


 


Urine Blood   


 


Urine Nitrate   


 


Urine Bilirubin   


 


Urine Urobilinogen   


 


Ur Leukocyte Esterase   


 


Urine RBC   


 


Urine WBC   


 


Urine Bacteria   


 


Salicylates    < 1 L


 


Urine Opiates Screen   


 


Urine Methadone Screen   


 


Acetaminophen    < 10.0 L


 


Ur Barbiturates Screen   


 


Ur Phencyclidine Scrn   


 


Ur Amphetamines Screen   


 


U Benzodiazepines Scrn   


 


U Oth Cocaine Metabols   


 


U Cannabinoids Screen   


 


Alcohol, Quantitative   














  10/03/18 10/03/18 10/03/18





  15:45 15:45 16:40


 


WBC   


 


RBC   


 


Hgb   


 


Hct   


 


MCV   


 


MCH   


 


MCHC   


 


RDW   


 


Plt Count   


 


MPV   


 


Gran %   


 


Lymph % (Auto)   


 


Mono % (Auto)   


 


Eos % (Auto)   


 


Baso % (Auto)   


 


Gran #   


 


Lymph # (Auto)   


 


Mono # (Auto)   


 


Eos # (Auto)   


 


Baso # (Auto)   


 


Neutrophils % (Manual)   


 


Lymphocytes % (Manual)   


 


Monocytes % (Manual)   


 


Platelet Evaluation   


 


pO2    95 H


 


VBG pH    7.29 L


 


VBG pCO2    54.0


 


VBG HCO3    26.0


 


VBG Total CO2    27.7


 


VBG O2 Sat (Calc)    99.2 H


 


VBG Base Excess    -1.4 L


 


VBG Potassium    3.5 L


 


Glucose    71


 


Lactate    1.1


 


FiO2    21.0


 


Sodium    141.0


 


Potassium   


 


Chloride    108.0 H


 


Carbon Dioxide   


 


Anion Gap   


 


BUN   


 


Creatinine   


 


Est GFR ( Amer)   


 


Est GFR (Non-Af Amer)   


 


Random Glucose   


 


Calcium   


 


Phosphorus   


 


Magnesium   


 


Total Bilirubin   


 


AST   


 


ALT   


 


Alkaline Phosphatase   


 


Total Protein   


 


Albumin   


 


Globulin   


 


Albumin/Globulin Ratio   


 


Beta HCG, Quant   < 2.39 


 


Venous Blood Potassium    3.5 L


 


Urine Color   


 


Urine Appearance   


 


Urine pH   


 


Ur Specific Gravity   


 


Urine Protein   


 


Urine Glucose (UA)   


 


Urine Ketones   


 


Urine Blood   


 


Urine Nitrate   


 


Urine Bilirubin   


 


Urine Urobilinogen   


 


Ur Leukocyte Esterase   


 


Urine RBC   


 


Urine WBC   


 


Urine Bacteria   


 


Salicylates   


 


Urine Opiates Screen   


 


Urine Methadone Screen   


 


Acetaminophen   


 


Ur Barbiturates Screen   


 


Ur Phencyclidine Scrn   


 


Ur Amphetamines Screen   


 


U Benzodiazepines Scrn   


 


U Oth Cocaine Metabols   


 


U Cannabinoids Screen   


 


Alcohol, Quantitative  < 10  














  10/03/18 10/03/18 10/04/18





  16:55 16:55 06:00


 


WBC    9.4  D


 


RBC    4.12


 


Hgb    12.0


 


Hct    35.6 L


 


MCV    86.4


 


MCH    29.1


 


MCHC    33.7


 


RDW    12.7


 


Plt Count    264


 


MPV    10.2


 


Gran %    85.7 H


 


Lymph % (Auto)    12.6 L


 


Mono % (Auto)    1.7


 


Eos % (Auto)    0.0 L


 


Baso % (Auto)    0.0


 


Gran #    8.05 H


 


Lymph # (Auto)    1.2


 


Mono # (Auto)    0.2


 


Eos # (Auto)    0.0


 


Baso # (Auto)    0.00


 


Neutrophils % (Manual)   


 


Lymphocytes % (Manual)   


 


Monocytes % (Manual)   


 


Platelet Evaluation   


 


pO2   


 


VBG pH   


 


VBG pCO2   


 


VBG HCO3   


 


VBG Total CO2   


 


VBG O2 Sat (Calc)   


 


VBG Base Excess   


 


VBG Potassium   


 


Glucose   


 


Lactate   


 


FiO2   


 


Sodium   


 


Potassium   


 


Chloride   


 


Carbon Dioxide   


 


Anion Gap   


 


BUN   


 


Creatinine   


 


Est GFR ( Amer)   


 


Est GFR (Non-Af Amer)   


 


Random Glucose   


 


Calcium   


 


Phosphorus   


 


Magnesium   


 


Total Bilirubin   


 


AST   


 


ALT   


 


Alkaline Phosphatase   


 


Total Protein   


 


Albumin   


 


Globulin   


 


Albumin/Globulin Ratio   


 


Beta HCG, Quant   


 


Venous Blood Potassium   


 


Urine Color  Yellow  


 


Urine Appearance  Sl cloudy  


 


Urine pH  6.0  


 


Ur Specific Gravity  >= 1.030  


 


Urine Protein  30 H  


 


Urine Glucose (UA)  250 H  


 


Urine Ketones  Trace H  


 


Urine Blood  Negative  


 


Urine Nitrate  Negative  


 


Urine Bilirubin  Negative  


 


Urine Urobilinogen  0.2  


 


Ur Leukocyte Esterase  Negative  


 


Urine RBC  Negative  


 


Urine WBC  0 - 2  


 


Urine Bacteria  Many  


 


Salicylates   


 


Urine Opiates Screen   Negative 


 


Urine Methadone Screen   Negative 


 


Acetaminophen   


 


Ur Barbiturates Screen   Negative 


 


Ur Phencyclidine Scrn   Negative 


 


Ur Amphetamines Screen   Negative 


 


U Benzodiazepines Scrn   Negative 


 


U Oth Cocaine Metabols   Negative 


 


U Cannabinoids Screen   Positive H 


 


Alcohol, Quantitative   














  10/04/18





  06:00


 


WBC 


 


RBC 


 


Hgb 


 


Hct 


 


MCV 


 


MCH 


 


MCHC 


 


RDW 


 


Plt Count 


 


MPV 


 


Gran % 


 


Lymph % (Auto) 


 


Mono % (Auto) 


 


Eos % (Auto) 


 


Baso % (Auto) 


 


Gran # 


 


Lymph # (Auto) 


 


Mono # (Auto) 


 


Eos # (Auto) 


 


Baso # (Auto) 


 


Neutrophils % (Manual) 


 


Lymphocytes % (Manual) 


 


Monocytes % (Manual) 


 


Platelet Evaluation 


 


pO2 


 


VBG pH 


 


VBG pCO2 


 


VBG HCO3 


 


VBG Total CO2 


 


VBG O2 Sat (Calc) 


 


VBG Base Excess 


 


VBG Potassium 


 


Glucose 


 


Lactate 


 


FiO2 


 


Sodium  136


 


Potassium  4.6


 


Chloride  106


 


Carbon Dioxide  21


 


Anion Gap  14


 


BUN  10


 


Creatinine  0.6 L


 


Est GFR ( Amer)  > 60


 


Est GFR (Non-Af Amer)  > 60


 


Random Glucose  91


 


Calcium  8.8


 


Phosphorus  3.2


 


Magnesium  1.9


 


Total Bilirubin  0.3


 


AST  50 H


 


ALT  34


 


Alkaline Phosphatase  44


 


Total Protein  6.2


 


Albumin  3.8


 


Globulin  2.4


 


Albumin/Globulin Ratio  1.6


 


Beta HCG, Quant 


 


Venous Blood Potassium 


 


Urine Color 


 


Urine Appearance 


 


Urine pH 


 


Ur Specific Gravity 


 


Urine Protein 


 


Urine Glucose (UA) 


 


Urine Ketones 


 


Urine Blood 


 


Urine Nitrate 


 


Urine Bilirubin 


 


Urine Urobilinogen 


 


Ur Leukocyte Esterase 


 


Urine RBC 


 


Urine WBC 


 


Urine Bacteria 


 


Salicylates 


 


Urine Opiates Screen 


 


Urine Methadone Screen 


 


Acetaminophen 


 


Ur Barbiturates Screen 


 


Ur Phencyclidine Scrn 


 


Ur Amphetamines Screen 


 


U Benzodiazepines Scrn 


 


U Oth Cocaine Metabols 


 


U Cannabinoids Screen 


 


Alcohol, Quantitative 











EKG/Cardiology Studies: 


Cardiology / EKG Studies





10/03/18 15:18


ELECTROCARDIOGRAM Stat 


   Comment: 


   Reason For Exam: anxiety














Review of Systems





- Constitutional


Constitutional: absent: Fever, Chills, Sweats





- EENT


Eyes: absent: Blurred Vision


Ears: absent: Decreased Hearing





- Cardiovascular


Cardiovascular: absent: Chest Pain, Chest Pain at Rest, Dyspnea on Exertion





- Respiratory


Respiratory: absent: Cough, Dyspnea, Dyspnea on Exertion, Wheezing





- Gastrointestinal


Gastrointestinal: absent: Abdominal Pain, Constipation, Diarrhea, Nausea, 

Vomiting





- Genitourinary


Genitourinary: absent: Change in Urinary Stream, Difficulty Urinating, Dysuria, 

Flank Pain, Hematuria





- Musculoskeletal


Musculoskeletal: Back Pain, Muscle Weakness, Numbness, Tingling





- Neurological


Neurological: Numbness, Tingling, Weakness





Critical Care Progress Note





- Ventilator Checklist


Head of Bed 30 Degrees: Yes


PUD Prophalyxis: Yes


DVT Prophylaxis: Yes





- Extremities/Vascular


Does the Patient have a Central Venous Catheter?: No


Does the Patient need a Central Venous Catheter?: No


Does the Patient have a Guan Catheter?: No


Does the Patient need a Guan Catheter?: No





Assessment/Plan





- Assessment and Plan (Free Text)


Assessment: 


28 year old female with past medical history of anxiety and sciatica presents 

with left leg weakness and numbness.





Plan: 


Neuro: 





-AAOx3, moving extremities past midline. 


-Lumbar MRI unremarkable for cauda equina or any spinal cord lesions.


-Weakness and numbness in left leg possibly 2/2 to piriformis syndrome.


-Patient currently has no headache, change in vision, neck stiffness, or nausea.

Consider Head CT if patient has worsening symptoms.


-Monitor neuro status.  


-Reorient patient as necessary.


-Dr. Velasquez, Neurology, consulted for recommendations.





Cardio: 





-RRR, normotensive, no signs of HD compromise 


-Maintain MAP>65.  


-Monitor for S/S, HD compromise. 





Pulm: 





-No signs of respiratory distress. CTA B/L 


-Patient is stating well on room air. 


-Maintain O2 saturation>95%. 





GI: 





-Tolerating diet well. 


-Abdominal CT: shows minimal pericholecystic fluid with possible cholecystitis. 

No consideration for ultrasound at this time due to clinical stability.


-Protonix or pepcid  





/Nephro: 





-BUN/Cr stable


-Good urine output 


-Continue monitoring. 


-Replete electrolytes as needed. 


-Maintain euvolemia. 





Endocrinology: 





-Random glucose: 91


-Maintain euglycemia. 





Heme/Onc: 





-H/H stable.  


-No signs of HD compromise. 


-Continue monitoring H/H 





ID: 





-Afebrile, leukocytosis resolved 


-Follow up BCx, UCx, Procalcitonin, Lactate 


-Monitor for signs and symptoms of infection.





DVT prophylaxis: SCD


GI prophylaxis: pepcid 20 mg 





Disposition: Patient will be transferred to medicine floors. Patient is stable 

and ready for transfer.





Patient plan discussed with Dr. Cheng





 








- Date & Time


Date: 10/04/18


Time: 08:47





<Lakisha Cheng - Last Filed: 10/04/18 13:53>





CCU Objective





- Vital Signs / Intake & Output


Intake and Output (Last 8hrs): 


                                 Intake & Output











 10/03/18 10/04/18 10/04/18





 22:59 06:59 14:59


 


Intake Total  750 120


 


Output Total  450 500


 


Balance  300 -380


 


Weight 61.689 kg 61.689 kg 


 


Intake:   


 


  IV  450 


 


    Left Antecubital  450 


 


  Oral  300 120


 


Output:   


 


  Urine  450 500


 


    Urine, Voided  450 500


 


Other:   


 


  # Bowel Movements   0














- Medications


Active Medications: 


Active Medications











Generic Name Dose Route Start Last Admin





  Trade Name Freq  PRN Reason Stop Dose Admin


 


Acetaminophen  650 mg  10/04/18 07:21  10/04/18 10:34





  Tylenol 325mg Tab  PO   650 mg





  Q6H PRN   Administration





  Pain, Mild (1-3)   





     





     





     


 


Alprazolam  0.5 mg  10/04/18 03:45  





  Xanax  PO   





  BID PRN   





  Anxiety   





     





  Protocol   





     


 


Cyclobenzaprine HCl  5 mg  10/04/18 10:51  





  Flexeril  PO   





  TID PRN   





  Muscle spasm   





     





     





     


 


Dexamethasone  4 mg  10/04/18 14:00  





  Decadron  PO   





  Q8 MUNA   





     





     





     





     


 


Famotidine  20 mg  10/04/18 10:00  10/04/18 10:34





  Pepcid  PO   20 mg





  1000,2200 MUNA   Administration





     





     





     





     


 


Vancomycin HCl  1 gm in 250 mls @ 167 mls/hr  10/03/18 20:30  10/04/18 10:35





  Vancomycin 1gm  IVPB   167 mls/hr





  Q12H MUNA   Administration





     





     





  Protocol   





     














- Patient Studies


Lab Studies: 


                                   Lab Studies











  10/04/18 10/04/18 10/04/18 Range/Units





  12:10 06:00 06:00 


 


WBC    9.4  D  (4.5-11.0)  10^3/ul


 


RBC    4.12  (3.5-6.1)  10^6/uL


 


Hgb    12.0  (12.0-16.0)  g/dL


 


Hct    35.6 L  (36.0-48.0)  %


 


MCV    86.4  (80.0-105.0)  fl


 


MCH    29.1  (25.0-35.0)  pg


 


MCHC    33.7  (31.0-37.0)  g/dl


 


RDW    12.7  (11.5-14.5)  %


 


Plt Count    264  (120.0-450.0)  10^3/uL


 


MPV    10.2  (7.0-11.0)  fl


 


Gran %    85.7 H  (50.0-68.0)  %


 


Lymph % (Auto)    12.6 L  (22.0-35.0)  %


 


Mono % (Auto)    1.7  (1.0-6.0)  %


 


Eos % (Auto)    0.0 L  (1.5-5.0)  %


 


Baso % (Auto)    0.0  (0.0-3.0)  %


 


Gran #    8.05 H  (1.4-6.5)  


 


Lymph # (Auto)    1.2  (1.2-3.4)  


 


Mono # (Auto)    0.2  (0.1-0.6)  


 


Eos # (Auto)    0.0  (0.0-0.7)  


 


Baso # (Auto)    0.00  (0.0-2.0)  K/mm3


 


Neutrophils % (Manual)     (50.0-70.0)  %


 


Lymphocytes % (Manual)     (22.0-35.0)  %


 


Monocytes % (Manual)     (1.0-6.0)  %


 


Platelet Evaluation     (NORMAL)  


 


ESR  3    (0.0-20.0)  mm/hr


 


pO2     (30-55)  mm/Hg


 


VBG pH     (7.32-7.43)  


 


VBG pCO2     (40-60)  


 


VBG HCO3     (21-28)  mmol/l


 


VBG Total CO2     (22-28)  mmol.L


 


VBG O2 Sat (Calc)     (40-65)  %


 


VBG Base Excess     (0.0-2.0)  mmol/L


 


VBG Potassium     (3.6-5.2)  mmol/L


 


Glucose     ()  mg/dl


 


Lactate     (0.7-2.1)  mmol/L


 


FiO2     %


 


Sodium   136   (132-148)  mmol/L


 


Potassium   4.6   (3.6-5.0)  mmol/L


 


Chloride   106   ()  mmol/L


 


Carbon Dioxide   21   (21-33)  mmol/L


 


Anion Gap   14   (10-20)  


 


BUN   10   (7-21)  mg/dL


 


Creatinine   0.6 L   (0.7-1.2)  mg/dl


 


Est GFR (African Amer)   > 60   


 


Est GFR (Non-Af Amer)   > 60   


 


Random Glucose   91   ()  mg/dL


 


Hemoglobin A1c     (4.2-6.5)  %


 


Calcium   8.8   (8.4-10.5)  mg/dL


 


Phosphorus   3.2   (2.5-4.5)  mg/dL


 


Magnesium   1.9   (1.7-2.2)  mg/dL


 


Total Bilirubin   0.3   (0.2-1.3)  mg/dL


 


AST   50 H   (14-36)  U/L


 


ALT   34   (7-56)  U/L


 


Alkaline Phosphatase   44   ()  U/L


 


Total Protein   6.2   (5.8-8.3)  g/dL


 


Albumin   3.8   (3.0-4.8)  g/dL


 


Globulin   2.4   gm/dL


 


Albumin/Globulin Ratio   1.6   (1.1-1.8)  


 


Procalcitonin     (0.19-0.49)  NG/ML


 


Beta HCG, Quant     (0-6.15)  mIU/mL


 


Venous Blood Potassium     (3.6-5.2)  mmol/L


 


Urine Color     (YELLOW)  


 


Urine Appearance     (CLEAR)  


 


Urine pH     (4.7-8.0)  


 


Ur Specific Gravity     (1.005-1.035)  


 


Urine Protein     (<30 mg/dL)  mg/dL


 


Urine Glucose (UA)     (NEGATIVE)  mg/dL


 


Urine Ketones     (NEGATIVE)  mg/dL


 


Urine Blood     (NEGATIVE)  


 


Urine Nitrate     (NEGATIVE)  


 


Urine Bilirubin     (NEGATIVE)  


 


Urine Urobilinogen     (<1 E.U./dL)  E.U./dL


 


Ur Leukocyte Esterase     (NEGATIVE)  Evangelina/uL


 


Urine RBC     (0-2)  /hpf


 


Urine WBC     (0-6)  /hpf


 


Urine Bacteria     (NEG)  


 


Salicylates     (2.0-20.0)  mg/dL


 


Urine Opiates Screen     (NEGATIVE)  


 


Urine Methadone Screen     (NEGATIVE)  


 


Acetaminophen     (10.0-20.0)  ug/ml


 


Ur Barbiturates Screen     (NEGATIVE)  


 


Ur Phencyclidine Scrn     (NEGATIVE)  


 


Ur Amphetamines Screen     (NEGATIVE)  


 


U Benzodiazepines Scrn     (NEGATIVE)  


 


U Oth Cocaine Metabols     (NEGATIVE)  


 


U Cannabinoids Screen     (NEGATIVE)  


 


Alcohol, Quantitative     (0-10)  mg/dL














  10/04/18 10/03/18 10/03/18 Range/Units





  06:00 16:55 16:55 


 


WBC     (4.5-11.0)  10^3/ul


 


RBC     (3.5-6.1)  10^6/uL


 


Hgb     (12.0-16.0)  g/dL


 


Hct     (36.0-48.0)  %


 


MCV     (80.0-105.0)  fl


 


MCH     (25.0-35.0)  pg


 


MCHC     (31.0-37.0)  g/dl


 


RDW     (11.5-14.5)  %


 


Plt Count     (120.0-450.0)  10^3/uL


 


MPV     (7.0-11.0)  fl


 


Gran %     (50.0-68.0)  %


 


Lymph % (Auto)     (22.0-35.0)  %


 


Mono % (Auto)     (1.0-6.0)  %


 


Eos % (Auto)     (1.5-5.0)  %


 


Baso % (Auto)     (0.0-3.0)  %


 


Gran #     (1.4-6.5)  


 


Lymph # (Auto)     (1.2-3.4)  


 


Mono # (Auto)     (0.1-0.6)  


 


Eos # (Auto)     (0.0-0.7)  


 


Baso # (Auto)     (0.0-2.0)  K/mm3


 


Neutrophils % (Manual)     (50.0-70.0)  %


 


Lymphocytes % (Manual)     (22.0-35.0)  %


 


Monocytes % (Manual)     (1.0-6.0)  %


 


Platelet Evaluation     (NORMAL)  


 


ESR     (0.0-20.0)  mm/hr


 


pO2     (30-55)  mm/Hg


 


VBG pH     (7.32-7.43)  


 


VBG pCO2     (40-60)  


 


VBG HCO3     (21-28)  mmol/l


 


VBG Total CO2     (22-28)  mmol.L


 


VBG O2 Sat (Calc)     (40-65)  %


 


VBG Base Excess     (0.0-2.0)  mmol/L


 


VBG Potassium     (3.6-5.2)  mmol/L


 


Glucose     ()  mg/dl


 


Lactate     (0.7-2.1)  mmol/L


 


FiO2     %


 


Sodium     (132-148)  mmol/L


 


Potassium     (3.6-5.0)  mmol/L


 


Chloride     ()  mmol/L


 


Carbon Dioxide     (21-33)  mmol/L


 


Anion Gap     (10-20)  


 


BUN     (7-21)  mg/dL


 


Creatinine     (0.7-1.2)  mg/dl


 


Est GFR (African Amer)     


 


Est GFR (Non-Af Amer)     


 


Random Glucose     ()  mg/dL


 


Hemoglobin A1c     (4.2-6.5)  %


 


Calcium     (8.4-10.5)  mg/dL


 


Phosphorus     (2.5-4.5)  mg/dL


 


Magnesium     (1.7-2.2)  mg/dL


 


Total Bilirubin     (0.2-1.3)  mg/dL


 


AST     (14-36)  U/L


 


ALT     (7-56)  U/L


 


Alkaline Phosphatase     ()  U/L


 


Total Protein     (5.8-8.3)  g/dL


 


Albumin     (3.0-4.8)  g/dL


 


Globulin     gm/dL


 


Albumin/Globulin Ratio     (1.1-1.8)  


 


Procalcitonin  0.55 H    (0.19-0.49)  NG/ML


 


Beta HCG, Quant     (0-6.15)  mIU/mL


 


Venous Blood Potassium     (3.6-5.2)  mmol/L


 


Urine Color    Yellow  (YELLOW)  


 


Urine Appearance    Sl cloudy  (CLEAR)  


 


Urine pH    6.0  (4.7-8.0)  


 


Ur Specific Gravity    >= 1.030  (1.005-1.035)  


 


Urine Protein    30 H  (<30 mg/dL)  mg/dL


 


Urine Glucose (UA)    250 H  (NEGATIVE)  mg/dL


 


Urine Ketones    Trace H  (NEGATIVE)  mg/dL


 


Urine Blood    Negative  (NEGATIVE)  


 


Urine Nitrate    Negative  (NEGATIVE)  


 


Urine Bilirubin    Negative  (NEGATIVE)  


 


Urine Urobilinogen    0.2  (<1 E.U./dL)  E.U./dL


 


Ur Leukocyte Esterase    Negative  (NEGATIVE)  Evangelina/uL


 


Urine RBC    Negative  (0-2)  /hpf


 


Urine WBC    0 - 2  (0-6)  /hpf


 


Urine Bacteria    Many  (NEG)  


 


Salicylates     (2.0-20.0)  mg/dL


 


Urine Opiates Screen   Negative   (NEGATIVE)  


 


Urine Methadone Screen   Negative   (NEGATIVE)  


 


Acetaminophen     (10.0-20.0)  ug/ml


 


Ur Barbiturates Screen   Negative   (NEGATIVE)  


 


Ur Phencyclidine Scrn   Negative   (NEGATIVE)  


 


Ur Amphetamines Screen   Negative   (NEGATIVE)  


 


U Benzodiazepines Scrn   Negative   (NEGATIVE)  


 


U Oth Cocaine Metabols   Negative   (NEGATIVE)  


 


U Cannabinoids Screen   Positive H   (NEGATIVE)  


 


Alcohol, Quantitative     (0-10)  mg/dL














  10/03/18 10/03/18 10/03/18 Range/Units





  16:40 15:45 15:45 


 


WBC     (4.5-11.0)  10^3/ul


 


RBC     (3.5-6.1)  10^6/uL


 


Hgb     (12.0-16.0)  g/dL


 


Hct     (36.0-48.0)  %


 


MCV     (80.0-105.0)  fl


 


MCH     (25.0-35.0)  pg


 


MCHC     (31.0-37.0)  g/dl


 


RDW     (11.5-14.5)  %


 


Plt Count     (120.0-450.0)  10^3/uL


 


MPV     (7.0-11.0)  fl


 


Gran %     (50.0-68.0)  %


 


Lymph % (Auto)     (22.0-35.0)  %


 


Mono % (Auto)     (1.0-6.0)  %


 


Eos % (Auto)     (1.5-5.0)  %


 


Baso % (Auto)     (0.0-3.0)  %


 


Gran #     (1.4-6.5)  


 


Lymph # (Auto)     (1.2-3.4)  


 


Mono # (Auto)     (0.1-0.6)  


 


Eos # (Auto)     (0.0-0.7)  


 


Baso # (Auto)     (0.0-2.0)  K/mm3


 


Neutrophils % (Manual)     (50.0-70.0)  %


 


Lymphocytes % (Manual)     (22.0-35.0)  %


 


Monocytes % (Manual)     (1.0-6.0)  %


 


Platelet Evaluation     (NORMAL)  


 


ESR     (0.0-20.0)  mm/hr


 


pO2  95 H    (30-55)  mm/Hg


 


VBG pH  7.29 L    (7.32-7.43)  


 


VBG pCO2  54.0    (40-60)  


 


VBG HCO3  26.0    (21-28)  mmol/l


 


VBG Total CO2  27.7    (22-28)  mmol.L


 


VBG O2 Sat (Calc)  99.2 H    (40-65)  %


 


VBG Base Excess  -1.4 L    (0.0-2.0)  mmol/L


 


VBG Potassium  3.5 L    (3.6-5.2)  mmol/L


 


Glucose  71    ()  mg/dl


 


Lactate  1.1    (0.7-2.1)  mmol/L


 


FiO2  21.0    %


 


Sodium  141.0    (132-148)  mmol/L


 


Potassium     (3.6-5.0)  mmol/L


 


Chloride  108.0 H    ()  mmol/L


 


Carbon Dioxide     (21-33)  mmol/L


 


Anion Gap     (10-20)  


 


BUN     (7-21)  mg/dL


 


Creatinine     (0.7-1.2)  mg/dl


 


Est GFR (African Amer)     


 


Est GFR (Non-Af Amer)     


 


Random Glucose     ()  mg/dL


 


Hemoglobin A1c   5.2   (4.2-6.5)  %


 


Calcium     (8.4-10.5)  mg/dL


 


Phosphorus     (2.5-4.5)  mg/dL


 


Magnesium     (1.7-2.2)  mg/dL


 


Total Bilirubin     (0.2-1.3)  mg/dL


 


AST     (14-36)  U/L


 


ALT     (7-56)  U/L


 


Alkaline Phosphatase     ()  U/L


 


Total Protein     (5.8-8.3)  g/dL


 


Albumin     (3.0-4.8)  g/dL


 


Globulin     gm/dL


 


Albumin/Globulin Ratio     (1.1-1.8)  


 


Procalcitonin     (0.19-0.49)  NG/ML


 


Beta HCG, Quant    < 2.39  (0-6.15)  mIU/mL


 


Venous Blood Potassium  3.5 L    (3.6-5.2)  mmol/L


 


Urine Color     (YELLOW)  


 


Urine Appearance     (CLEAR)  


 


Urine pH     (4.7-8.0)  


 


Ur Specific Gravity     (1.005-1.035)  


 


Urine Protein     (<30 mg/dL)  mg/dL


 


Urine Glucose (UA)     (NEGATIVE)  mg/dL


 


Urine Ketones     (NEGATIVE)  mg/dL


 


Urine Blood     (NEGATIVE)  


 


Urine Nitrate     (NEGATIVE)  


 


Urine Bilirubin     (NEGATIVE)  


 


Urine Urobilinogen     (<1 E.U./dL)  E.U./dL


 


Ur Leukocyte Esterase     (NEGATIVE)  Eavngelina/uL


 


Urine RBC     (0-2)  /hpf


 


Urine WBC     (0-6)  /hpf


 


Urine Bacteria     (NEG)  


 


Salicylates     (2.0-20.0)  mg/dL


 


Urine Opiates Screen     (NEGATIVE)  


 


Urine Methadone Screen     (NEGATIVE)  


 


Acetaminophen     (10.0-20.0)  ug/ml


 


Ur Barbiturates Screen     (NEGATIVE)  


 


Ur Phencyclidine Scrn     (NEGATIVE)  


 


Ur Amphetamines Screen     (NEGATIVE)  


 


U Benzodiazepines Scrn     (NEGATIVE)  


 


U Oth Cocaine Metabols     (NEGATIVE)  


 


U Cannabinoids Screen     (NEGATIVE)  


 


Alcohol, Quantitative     (0-10)  mg/dL














  10/03/18 10/03/18 10/03/18 Range/Units





  15:45 15:45 15:45 


 


WBC     (4.5-11.0)  10^3/ul


 


RBC     (3.5-6.1)  10^6/uL


 


Hgb     (12.0-16.0)  g/dL


 


Hct     (36.0-48.0)  %


 


MCV     (80.0-105.0)  fl


 


MCH     (25.0-35.0)  pg


 


MCHC     (31.0-37.0)  g/dl


 


RDW     (11.5-14.5)  %


 


Plt Count     (120.0-450.0)  10^3/uL


 


MPV     (7.0-11.0)  fl


 


Gran %     (50.0-68.0)  %


 


Lymph % (Auto)     (22.0-35.0)  %


 


Mono % (Auto)     (1.0-6.0)  %


 


Eos % (Auto)     (1.5-5.0)  %


 


Baso % (Auto)     (0.0-3.0)  %


 


Gran #     (1.4-6.5)  


 


Lymph # (Auto)     (1.2-3.4)  


 


Mono # (Auto)     (0.1-0.6)  


 


Eos # (Auto)     (0.0-0.7)  


 


Baso # (Auto)     (0.0-2.0)  K/mm3


 


Neutrophils % (Manual)     (50.0-70.0)  %


 


Lymphocytes % (Manual)     (22.0-35.0)  %


 


Monocytes % (Manual)     (1.0-6.0)  %


 


Platelet Evaluation     (NORMAL)  


 


ESR     (0.0-20.0)  mm/hr


 


pO2     (30-55)  mm/Hg


 


VBG pH     (7.32-7.43)  


 


VBG pCO2     (40-60)  


 


VBG HCO3     (21-28)  mmol/l


 


VBG Total CO2     (22-28)  mmol.L


 


VBG O2 Sat (Calc)     (40-65)  %


 


VBG Base Excess     (0.0-2.0)  mmol/L


 


VBG Potassium     (3.6-5.2)  mmol/L


 


Glucose     ()  mg/dl


 


Lactate     (0.7-2.1)  mmol/L


 


FiO2     %


 


Sodium    141  (132-148)  mmol/L


 


Potassium    3.7  (3.6-5.0)  mmol/L


 


Chloride    104  ()  mmol/L


 


Carbon Dioxide    26  (21-33)  mmol/L


 


Anion Gap    15  (10-20)  


 


BUN    16  (7-21)  mg/dL


 


Creatinine    0.7  (0.7-1.2)  mg/dl


 


Est GFR (African Amer)    > 60  


 


Est GFR (Non-Af Amer)    > 60  


 


Random Glucose    73  ()  mg/dL


 


Hemoglobin A1c     (4.2-6.5)  %


 


Calcium    9.0  (8.4-10.5)  mg/dL


 


Phosphorus     (2.5-4.5)  mg/dL


 


Magnesium     (1.7-2.2)  mg/dL


 


Total Bilirubin    0.2  (0.2-1.3)  mg/dL


 


AST    44 H  (14-36)  U/L


 


ALT    32  (7-56)  U/L


 


Alkaline Phosphatase    55  ()  U/L


 


Total Protein    7.7  (5.8-8.3)  g/dL


 


Albumin    4.9 H  (3.0-4.8)  g/dL


 


Globulin    2.8  gm/dL


 


Albumin/Globulin Ratio    1.8  (1.1-1.8)  


 


Procalcitonin     (0.19-0.49)  NG/ML


 


Beta HCG, Quant     (0-6.15)  mIU/mL


 


Venous Blood Potassium     (3.6-5.2)  mmol/L


 


Urine Color     (YELLOW)  


 


Urine Appearance     (CLEAR)  


 


Urine pH     (4.7-8.0)  


 


Ur Specific Gravity     (1.005-1.035)  


 


Urine Protein     (<30 mg/dL)  mg/dL


 


Urine Glucose (UA)     (NEGATIVE)  mg/dL


 


Urine Ketones     (NEGATIVE)  mg/dL


 


Urine Blood     (NEGATIVE)  


 


Urine Nitrate     (NEGATIVE)  


 


Urine Bilirubin     (NEGATIVE)  


 


Urine Urobilinogen     (<1 E.U./dL)  E.U./dL


 


Ur Leukocyte Esterase     (NEGATIVE)  Evangelina/uL


 


Urine RBC     (0-2)  /hpf


 


Urine WBC     (0-6)  /hpf


 


Urine Bacteria     (NEG)  


 


Salicylates   < 1 L   (2.0-20.0)  mg/dL


 


Urine Opiates Screen     (NEGATIVE)  


 


Urine Methadone Screen     (NEGATIVE)  


 


Acetaminophen   < 10.0 L   (10.0-20.0)  ug/ml


 


Ur Barbiturates Screen     (NEGATIVE)  


 


Ur Phencyclidine Scrn     (NEGATIVE)  


 


Ur Amphetamines Screen     (NEGATIVE)  


 


U Benzodiazepines Scrn     (NEGATIVE)  


 


U Oth Cocaine Metabols     (NEGATIVE)  


 


U Cannabinoids Screen     (NEGATIVE)  


 


Alcohol, Quantitative  < 10    (0-10)  mg/dL














  10/03/18 Range/Units





  15:45 


 


WBC  31.5 H*  (4.5-11.0)  10^3/ul


 


RBC  4.43  (3.5-6.1)  10^6/uL


 


Hgb  13.1  (12.0-16.0)  g/dL


 


Hct  37.8  (36.0-48.0)  %


 


MCV  85.3  (80.0-105.0)  fl


 


MCH  29.6  (25.0-35.0)  pg


 


MCHC  34.7  (31.0-37.0)  g/dl


 


RDW  12.7  (11.5-14.5)  %


 


Plt Count  359  (120.0-450.0)  10^3/uL


 


MPV  10.0  (7.0-11.0)  fl


 


Gran %  82.4 H  (50.0-68.0)  %


 


Lymph % (Auto)  8.4 L  (22.0-35.0)  %


 


Mono % (Auto)  9.0 H  (1.0-6.0)  %


 


Eos % (Auto)  0.1 L  (1.5-5.0)  %


 


Baso % (Auto)  0.1  (0.0-3.0)  %


 


Gran #  25.99 H  (1.4-6.5)  


 


Lymph # (Auto)  2.7  (1.2-3.4)  


 


Mono # (Auto)  2.8 H  (0.1-0.6)  


 


Eos # (Auto)  0.0  (0.0-0.7)  


 


Baso # (Auto)  0.02  (0.0-2.0)  K/mm3


 


Neutrophils % (Manual)  84 H  (50.0-70.0)  %


 


Lymphocytes % (Manual)  13 L  (22.0-35.0)  %


 


Monocytes % (Manual)  3  (1.0-6.0)  %


 


Platelet Evaluation  Normal  (NORMAL)  


 


ESR   (0.0-20.0)  mm/hr


 


pO2   (30-55)  mm/Hg


 


VBG pH   (7.32-7.43)  


 


VBG pCO2   (40-60)  


 


VBG HCO3   (21-28)  mmol/l


 


VBG Total CO2   (22-28)  mmol.L


 


VBG O2 Sat (Calc)   (40-65)  %


 


VBG Base Excess   (0.0-2.0)  mmol/L


 


VBG Potassium   (3.6-5.2)  mmol/L


 


Glucose   ()  mg/dl


 


Lactate   (0.7-2.1)  mmol/L


 


FiO2   %


 


Sodium   (132-148)  mmol/L


 


Potassium   (3.6-5.0)  mmol/L


 


Chloride   ()  mmol/L


 


Carbon Dioxide   (21-33)  mmol/L


 


Anion Gap   (10-20)  


 


BUN   (7-21)  mg/dL


 


Creatinine   (0.7-1.2)  mg/dl


 


Est GFR (African Amer)   


 


Est GFR (Non-Af Amer)   


 


Random Glucose   ()  mg/dL


 


Hemoglobin A1c   (4.2-6.5)  %


 


Calcium   (8.4-10.5)  mg/dL


 


Phosphorus   (2.5-4.5)  mg/dL


 


Magnesium   (1.7-2.2)  mg/dL


 


Total Bilirubin   (0.2-1.3)  mg/dL


 


AST   (14-36)  U/L


 


ALT   (7-56)  U/L


 


Alkaline Phosphatase   ()  U/L


 


Total Protein   (5.8-8.3)  g/dL


 


Albumin   (3.0-4.8)  g/dL


 


Globulin   gm/dL


 


Albumin/Globulin Ratio   (1.1-1.8)  


 


Procalcitonin   (0.19-0.49)  NG/ML


 


Beta HCG, Quant   (0-6.15)  mIU/mL


 


Venous Blood Potassium   (3.6-5.2)  mmol/L


 


Urine Color   (YELLOW)  


 


Urine Appearance   (CLEAR)  


 


Urine pH   (4.7-8.0)  


 


Ur Specific Gravity   (1.005-1.035)  


 


Urine Protein   (<30 mg/dL)  mg/dL


 


Urine Glucose (UA)   (NEGATIVE)  mg/dL


 


Urine Ketones   (NEGATIVE)  mg/dL


 


Urine Blood   (NEGATIVE)  


 


Urine Nitrate   (NEGATIVE)  


 


Urine Bilirubin   (NEGATIVE)  


 


Urine Urobilinogen   (<1 E.U./dL)  E.U./dL


 


Ur Leukocyte Esterase   (NEGATIVE)  Evangelina/uL


 


Urine RBC   (0-2)  /hpf


 


Urine WBC   (0-6)  /hpf


 


Urine Bacteria   (NEG)  


 


Salicylates   (2.0-20.0)  mg/dL


 


Urine Opiates Screen   (NEGATIVE)  


 


Urine Methadone Screen   (NEGATIVE)  


 


Acetaminophen   (10.0-20.0)  ug/ml


 


Ur Barbiturates Screen   (NEGATIVE)  


 


Ur Phencyclidine Scrn   (NEGATIVE)  


 


Ur Amphetamines Screen   (NEGATIVE)  


 


U Benzodiazepines Scrn   (NEGATIVE)  


 


U Oth Cocaine Metabols   (NEGATIVE)  


 


U Cannabinoids Screen   (NEGATIVE)  


 


Alcohol, Quantitative   (0-10)  mg/dL








                         Laboratory Results - last 24 hr











  10/03/18 10/03/18 10/03/18





  15:45 15:45 15:45


 


WBC  31.5 H*  


 


RBC  4.43  


 


Hgb  13.1  


 


Hct  37.8  


 


MCV  85.3  


 


MCH  29.6  


 


MCHC  34.7  


 


RDW  12.7  


 


Plt Count  359  


 


MPV  10.0  


 


Gran %  82.4 H  


 


Lymph % (Auto)  8.4 L  


 


Mono % (Auto)  9.0 H  


 


Eos % (Auto)  0.1 L  


 


Baso % (Auto)  0.1  


 


Gran #  25.99 H  


 


Lymph # (Auto)  2.7  


 


Mono # (Auto)  2.8 H  


 


Eos # (Auto)  0.0  


 


Baso # (Auto)  0.02  


 


Neutrophils % (Manual)  84 H  


 


Lymphocytes % (Manual)  13 L  


 


Monocytes % (Manual)  3  


 


Platelet Evaluation  Normal  


 


ESR   


 


pO2   


 


VBG pH   


 


VBG pCO2   


 


VBG HCO3   


 


VBG Total CO2   


 


VBG O2 Sat (Calc)   


 


VBG Base Excess   


 


VBG Potassium   


 


Glucose   


 


Lactate   


 


FiO2   


 


Sodium   141 


 


Potassium   3.7 


 


Chloride   104 


 


Carbon Dioxide   26 


 


Anion Gap   15 


 


BUN   16 


 


Creatinine   0.7 


 


Est GFR ( Amer)   > 60 


 


Est GFR (Non-Af Amer)   > 60 


 


Random Glucose   73 


 


Hemoglobin A1c   


 


Calcium   9.0 


 


Phosphorus   


 


Magnesium   


 


Total Bilirubin   0.2 


 


AST   44 H 


 


ALT   32 


 


Alkaline Phosphatase   55 


 


Total Protein   7.7 


 


Albumin   4.9 H 


 


Globulin   2.8 


 


Albumin/Globulin Ratio   1.8 


 


Procalcitonin   


 


Beta HCG, Quant   


 


Venous Blood Potassium   


 


Urine Color   


 


Urine Appearance   


 


Urine pH   


 


Ur Specific Gravity   


 


Urine Protein   


 


Urine Glucose (UA)   


 


Urine Ketones   


 


Urine Blood   


 


Urine Nitrate   


 


Urine Bilirubin   


 


Urine Urobilinogen   


 


Ur Leukocyte Esterase   


 


Urine RBC   


 


Urine WBC   


 


Urine Bacteria   


 


Salicylates    < 1 L


 


Urine Opiates Screen   


 


Urine Methadone Screen   


 


Acetaminophen    < 10.0 L


 


Ur Barbiturates Screen   


 


Ur Phencyclidine Scrn   


 


Ur Amphetamines Screen   


 


U Benzodiazepines Scrn   


 


U Oth Cocaine Metabols   


 


U Cannabinoids Screen   


 


Alcohol, Quantitative   














  10/03/18 10/03/18 10/03/18





  15:45 15:45 15:45


 


WBC   


 


RBC   


 


Hgb   


 


Hct   


 


MCV   


 


MCH   


 


MCHC   


 


RDW   


 


Plt Count   


 


MPV   


 


Gran %   


 


Lymph % (Auto)   


 


Mono % (Auto)   


 


Eos % (Auto)   


 


Baso % (Auto)   


 


Gran #   


 


Lymph # (Auto)   


 


Mono # (Auto)   


 


Eos # (Auto)   


 


Baso # (Auto)   


 


Neutrophils % (Manual)   


 


Lymphocytes % (Manual)   


 


Monocytes % (Manual)   


 


Platelet Evaluation   


 


ESR   


 


pO2   


 


VBG pH   


 


VBG pCO2   


 


VBG HCO3   


 


VBG Total CO2   


 


VBG O2 Sat (Calc)   


 


VBG Base Excess   


 


VBG Potassium   


 


Glucose   


 


Lactate   


 


FiO2   


 


Sodium   


 


Potassium   


 


Chloride   


 


Carbon Dioxide   


 


Anion Gap   


 


BUN   


 


Creatinine   


 


Est GFR ( Amer)   


 


Est GFR (Non-Af Amer)   


 


Random Glucose   


 


Hemoglobin A1c    5.2


 


Calcium   


 


Phosphorus   


 


Magnesium   


 


Total Bilirubin   


 


AST   


 


ALT   


 


Alkaline Phosphatase   


 


Total Protein   


 


Albumin   


 


Globulin   


 


Albumin/Globulin Ratio   


 


Procalcitonin   


 


Beta HCG, Quant   < 2.39 


 


Venous Blood Potassium   


 


Urine Color   


 


Urine Appearance   


 


Urine pH   


 


Ur Specific Gravity   


 


Urine Protein   


 


Urine Glucose (UA)   


 


Urine Ketones   


 


Urine Blood   


 


Urine Nitrate   


 


Urine Bilirubin   


 


Urine Urobilinogen   


 


Ur Leukocyte Esterase   


 


Urine RBC   


 


Urine WBC   


 


Urine Bacteria   


 


Salicylates   


 


Urine Opiates Screen   


 


Urine Methadone Screen   


 


Acetaminophen   


 


Ur Barbiturates Screen   


 


Ur Phencyclidine Scrn   


 


Ur Amphetamines Screen   


 


U Benzodiazepines Scrn   


 


U Oth Cocaine Metabols   


 


U Cannabinoids Screen   


 


Alcohol, Quantitative  < 10  














  10/03/18 10/03/18 10/03/18





  16:40 16:55 16:55


 


WBC   


 


RBC   


 


Hgb   


 


Hct   


 


MCV   


 


MCH   


 


MCHC   


 


RDW   


 


Plt Count   


 


MPV   


 


Gran %   


 


Lymph % (Auto)   


 


Mono % (Auto)   


 


Eos % (Auto)   


 


Baso % (Auto)   


 


Gran #   


 


Lymph # (Auto)   


 


Mono # (Auto)   


 


Eos # (Auto)   


 


Baso # (Auto)   


 


Neutrophils % (Manual)   


 


Lymphocytes % (Manual)   


 


Monocytes % (Manual)   


 


Platelet Evaluation   


 


ESR   


 


pO2  95 H  


 


VBG pH  7.29 L  


 


VBG pCO2  54.0  


 


VBG HCO3  26.0  


 


VBG Total CO2  27.7  


 


VBG O2 Sat (Calc)  99.2 H  


 


VBG Base Excess  -1.4 L  


 


VBG Potassium  3.5 L  


 


Glucose  71  


 


Lactate  1.1  


 


FiO2  21.0  


 


Sodium  141.0  


 


Potassium   


 


Chloride  108.0 H  


 


Carbon Dioxide   


 


Anion Gap   


 


BUN   


 


Creatinine   


 


Est GFR ( Amer)   


 


Est GFR (Non-Af Amer)   


 


Random Glucose   


 


Hemoglobin A1c   


 


Calcium   


 


Phosphorus   


 


Magnesium   


 


Total Bilirubin   


 


AST   


 


ALT   


 


Alkaline Phosphatase   


 


Total Protein   


 


Albumin   


 


Globulin   


 


Albumin/Globulin Ratio   


 


Procalcitonin   


 


Beta HCG, Quant   


 


Venous Blood Potassium  3.5 L  


 


Urine Color   Yellow 


 


Urine Appearance   Sl cloudy 


 


Urine pH   6.0 


 


Ur Specific Gravity   >= 1.030 


 


Urine Protein   30 H 


 


Urine Glucose (UA)   250 H 


 


Urine Ketones   Trace H 


 


Urine Blood   Negative 


 


Urine Nitrate   Negative 


 


Urine Bilirubin   Negative 


 


Urine Urobilinogen   0.2 


 


Ur Leukocyte Esterase   Negative 


 


Urine RBC   Negative 


 


Urine WBC   0 - 2 


 


Urine Bacteria   Many 


 


Salicylates   


 


Urine Opiates Screen    Negative


 


Urine Methadone Screen    Negative


 


Acetaminophen   


 


Ur Barbiturates Screen    Negative


 


Ur Phencyclidine Scrn    Negative


 


Ur Amphetamines Screen    Negative


 


U Benzodiazepines Scrn    Negative


 


U Oth Cocaine Metabols    Negative


 


U Cannabinoids Screen    Positive H


 


Alcohol, Quantitative   














  10/04/18 10/04/18 10/04/18





  06:00 06:00 06:00


 


WBC   9.4  D 


 


RBC   4.12 


 


Hgb   12.0 


 


Hct   35.6 L 


 


MCV   86.4 


 


MCH   29.1 


 


MCHC   33.7 


 


RDW   12.7 


 


Plt Count   264 


 


MPV   10.2 


 


Gran %   85.7 H 


 


Lymph % (Auto)   12.6 L 


 


Mono % (Auto)   1.7 


 


Eos % (Auto)   0.0 L 


 


Baso % (Auto)   0.0 


 


Gran #   8.05 H 


 


Lymph # (Auto)   1.2 


 


Mono # (Auto)   0.2 


 


Eos # (Auto)   0.0 


 


Baso # (Auto)   0.00 


 


Neutrophils % (Manual)   


 


Lymphocytes % (Manual)   


 


Monocytes % (Manual)   


 


Platelet Evaluation   


 


ESR   


 


pO2   


 


VBG pH   


 


VBG pCO2   


 


VBG HCO3   


 


VBG Total CO2   


 


VBG O2 Sat (Calc)   


 


VBG Base Excess   


 


VBG Potassium   


 


Glucose   


 


Lactate   


 


FiO2   


 


Sodium    136


 


Potassium    4.6


 


Chloride    106


 


Carbon Dioxide    21


 


Anion Gap    14


 


BUN    10


 


Creatinine    0.6 L


 


Est GFR ( Amer)    > 60


 


Est GFR (Non-Af Amer)    > 60


 


Random Glucose    91


 


Hemoglobin A1c   


 


Calcium    8.8


 


Phosphorus    3.2


 


Magnesium    1.9


 


Total Bilirubin    0.3


 


AST    50 H


 


ALT    34


 


Alkaline Phosphatase    44


 


Total Protein    6.2


 


Albumin    3.8


 


Globulin    2.4


 


Albumin/Globulin Ratio    1.6


 


Procalcitonin  0.55 H  


 


Beta HCG, Quant   


 


Venous Blood Potassium   


 


Urine Color   


 


Urine Appearance   


 


Urine pH   


 


Ur Specific Gravity   


 


Urine Protein   


 


Urine Glucose (UA)   


 


Urine Ketones   


 


Urine Blood   


 


Urine Nitrate   


 


Urine Bilirubin   


 


Urine Urobilinogen   


 


Ur Leukocyte Esterase   


 


Urine RBC   


 


Urine WBC   


 


Urine Bacteria   


 


Salicylates   


 


Urine Opiates Screen   


 


Urine Methadone Screen   


 


Acetaminophen   


 


Ur Barbiturates Screen   


 


Ur Phencyclidine Scrn   


 


Ur Amphetamines Screen   


 


U Benzodiazepines Scrn   


 


U Oth Cocaine Metabols   


 


U Cannabinoids Screen   


 


Alcohol, Quantitative   














  10/04/18





  12:10


 


WBC 


 


RBC 


 


Hgb 


 


Hct 


 


MCV 


 


MCH 


 


MCHC 


 


RDW 


 


Plt Count 


 


MPV 


 


Gran % 


 


Lymph % (Auto) 


 


Mono % (Auto) 


 


Eos % (Auto) 


 


Baso % (Auto) 


 


Gran # 


 


Lymph # (Auto) 


 


Mono # (Auto) 


 


Eos # (Auto) 


 


Baso # (Auto) 


 


Neutrophils % (Manual) 


 


Lymphocytes % (Manual) 


 


Monocytes % (Manual) 


 


Platelet Evaluation 


 


ESR  3


 


pO2 


 


VBG pH 


 


VBG pCO2 


 


VBG HCO3 


 


VBG Total CO2 


 


VBG O2 Sat (Calc) 


 


VBG Base Excess 


 


VBG Potassium 


 


Glucose 


 


Lactate 


 


FiO2 


 


Sodium 


 


Potassium 


 


Chloride 


 


Carbon Dioxide 


 


Anion Gap 


 


BUN 


 


Creatinine 


 


Est GFR ( Amer) 


 


Est GFR (Non-Af Amer) 


 


Random Glucose 


 


Hemoglobin A1c 


 


Calcium 


 


Phosphorus 


 


Magnesium 


 


Total Bilirubin 


 


AST 


 


ALT 


 


Alkaline Phosphatase 


 


Total Protein 


 


Albumin 


 


Globulin 


 


Albumin/Globulin Ratio 


 


Procalcitonin 


 


Beta HCG, Quant 


 


Venous Blood Potassium 


 


Urine Color 


 


Urine Appearance 


 


Urine pH 


 


Ur Specific Gravity 


 


Urine Protein 


 


Urine Glucose (UA) 


 


Urine Ketones 


 


Urine Blood 


 


Urine Nitrate 


 


Urine Bilirubin 


 


Urine Urobilinogen 


 


Ur Leukocyte Esterase 


 


Urine RBC 


 


Urine WBC 


 


Urine Bacteria 


 


Salicylates 


 


Urine Opiates Screen 


 


Urine Methadone Screen 


 


Acetaminophen 


 


Ur Barbiturates Screen 


 


Ur Phencyclidine Scrn 


 


Ur Amphetamines Screen 


 


U Benzodiazepines Scrn 


 


U Oth Cocaine Metabols 


 


U Cannabinoids Screen 


 


Alcohol, Quantitative 











EKG/Cardiology Studies: 


Cardiology / EKG Studies





10/03/18 15:18


ELECTROCARDIOGRAM Stat 


   Comment: 


   Reason For Exam: anxiety














Critical Care Progress Note





- Nutrition


Nutrition: 


                                    Nutrition











 Category Date Time Status


 


 Regular Diet [DIET] Diets  10/04/18 Lunch Ordered














Addendum


Addendum: 





10/04/18 13:53


ICU Attending Addendum:


Patient seen and examined. Case reviewed on round with housestaff.  Agree with 

resident note above with the following additions/exceptions:


28 F  with past medical history of anxiety and sciatica presents with left leg 

weakness and numbness possibly sciatic nerve related.  


MRI shows no acute process


leukocytosis improved, no signs of infection


does not offer risk factors for spinal abscess





left leg much improved today in terms of strength and sensatoin


f/u neuro recs





Ok for transfer out of ICU





rest of care above  


Lakisha Cheng MD 


Intensivist

## 2018-10-04 NOTE — RAD
Date of service: 



10/03/2018



HISTORY:

 chest pain/back pain, leg numbness 



COMPARISON:

No prior. 



FINDINGS:



LUNGS:

The lungs are well inflated and clear.



PLEURA:

No significant pleural effusion identified, no pneumothorax apparent.



CARDIOVASCULAR:

Normal.



OSSEOUS STRUCTURES:

No significant abnormalities.



VISUALIZED UPPER ABDOMEN:

Normal.



OTHER FINDINGS:

None.



IMPRESSION:

No active pulmonary disease.

## 2018-10-04 NOTE — CON
DATE:  10/04/2018



LOCATION:  The patient is in the ICU.  The patient seen in 129, bed 1.



CHIEF COMPLAINT:  Pain in her left buttocks.



HISTORY OF PRESENT ILLNESS:  This is a 28-year-old who has been having

sciatic pain.  According to the patient, she was diagnosed with sciatica by

her doctor for the last 1-2 months and now it became worse.  She came to

the emergency room, complaining of left leg numbness and weakness.  She

states _____ picking up her children, she developed sudden sensation in

both legs.  She denies any fevers, any chills.  She denies any chest pain,

shortness of breath or cough.  No abdominal pain.  No diarrhea or

constipation.  She denies any dysuria or frequency.  She did have a urinary

tract infection 1 month ago, was given an antibiotic.  She does not know

which antibiotic.  It was twice daily for 1 week and her symptoms at that

time had improved.



PAST MEDICAL HISTORY:  Significant for kidney stones and the recent urinary

tract infections.



PAST SURGICAL HISTORY:  Significant for removal of a kidney stone.  The

patient also with an appendectomy and a D and C in the past.



ALLERGIES:  THE PATIENT HAS AN ALLERGY TO CECLOR, SHE SAYS AN ANTIBIOTIC

HER MOM TOLD HER SHE WAS ALLERGIC TO MANY MANY YEARS AGO, WHAT TYPE OF

ALLERGY IS NOT ENTIRELY CLEAR.



MEDICATIONS AT HOME:  She is not taking any medications.  The last time she

was on a medication was the antibiotics for urinary tract infection a month

ago.



SOCIAL HISTORY:  She states that she has traveled to [place] last year in

02/2017 and she was in [place] many many years ago.  She is .  She

has 2 children.  No drugs.  Occasional alcohol.



PHYSICAL EXAMINATION:

VITAL SIGNS:  On exam, the patient's temperature is 97; heart rate of 101,

it is down to 80 and respiratory rate of 22, blood pressure is 106/60.

HEENT:  Examination of HEENT is unremarkable.

NECK:  Supple.

LUNGS:  Have decreased breath sounds.

HEART:  Normal S1, S2.

ABDOMEN:  Soft, nontender.  No rebound.  No guarding.  There is no CVA

tenderness.

NEUROLOGIC:  There is no point tenderness of her spine.  Her motor function

is 5/5.



LABORATORY DATA:  Laboratory examination reveals CBC with a white count of

31,500, hemoglobin of 13, platelets of 359, 82% granulocytosis.  A repeat

white count is down to today 9.4 with a hemoglobin of 12.  Chemistries

revealed BUN of 10, creatinine of 0.6, AST is 50.  Urinalysis is noted. 

Many bacteria, 0-2 wbc's, 250 glucose.  There is proteinuria, trace

ketones.  The toxicology reveals cannabinoids to be positive.  Imaging: 

The patient had a CAT scan of the abdomen and pelvis, which revealed

minimal pericholecystic fluid, possible cholecystitis.  Ultrasound maybe

indicated.  No obvious stone.  The patient also had a chest x-ray, which is

no active disease.  The patient had an abdominal ultrasound.



ASSESSMENT AND PLAN:  A 28-year-old female with history of kidney stones,

history of urinary tract infection, now with leukocytosis and tachycardia

and systemic inflammatory response syndrome.  Etiology of the leukocytosis

which resolved within a day is not entirely clear.  The patient was started

on vancomycin by Dr. Frances Meléndez and aztreonam.  The aztreonam was given

in the emergency room only.  No clear evidence of infection anywhere.  We

will or human immunodeficiency virus because of her age.  I would recommend

an MRI of the left sacroiliac joint and I would also do a vasculitis workup

as certain vasculitis can present with neurological findings such as this. 

A sed rate and a C-reactive protein.  We will continue the vancomycin for

now.  I doubt endocarditis.  Pending blood culture results.  I will order a

sed rate, C-reactive protein, NITESH with vasculitis workup.  I will make

further recommendations.  Continue the vancomycin at this time pending

blood cultures.  Etiology of the 30,000 white count is not clear.  The

patient also has LFT elevation and we will order a hepatitis profile.  In

addition to the human immunodeficiency virus, sed rate, C-reactive protein

and a vasculitis workup, we will check on the culture results, check on the

urine and blood cultures.  The patient does have proteinuria and glucosuria

and ketones in the urine.  I will make further recommendations upon the

availability of the initial results.  We will follow closely with you. 

Should have an MRI of the sacroiliac joint.





__________________________________________

Hardy Sparks MD





DD:  10/04/2018 11:29:53

DT:  10/04/2018 12:16:52

Job # [id number]

## 2018-10-04 NOTE — US
Date of service: 



10/04/2018



HISTORY:

r/o cholecystitis



COMPARISON:

CT abdomen and pelvis from 10/03/2018.



TECHNIQUE:

Grayscale imaging was performed.



FINDINGS:



LIVER:

Measures 14.2 cm.  Normal echogenicity of the liver parenchyma. No 

mass. No intrahepatic bile duct dilatation.



GALLBLADDER:

There are no gallstones, wall thickening or pericholecystic fluid.  

The sonographic Patel's sign is negative. 



COMMON BILE DUCT:

Measures 6.2 mm. No stones. No dilatation.



PANCREAS:

Unremarkable as visualized. No mass. No ductal dilatation.



RIGHT KIDNEY:

Measures 12.0cm.  Normal echogenicity. No calculus, mass, or 

hydronephrosis.  There is a 2.4 x 1.5 x 2.5 cm simple cyst in the 

upper pole. 



LEFT KIDNEY:

Measures 10.8cm.  Normal echogenicity.  No mass, or hydronephrosis.  

There is a 10 mm simple cyst in the upper pole and 14 x 14 x 15 mm 

simple cysts in the interpolar region. 



SPLEEN:

Normal in size and contour. No mass.



AORTA:

No aneurysmal dilatation. 



IVC:

Unremarkable. 



OTHER FINDINGS:

None. 



IMPRESSION:

No cholelithiasis or biliary dilatation.  No sonographic evidence for 

acute cholecystitis.

## 2018-10-04 NOTE — CP.PCM.PN
<Sheryl Arita - Last Filed: 10/04/18 15:26>





Subjective





- Date & Time of Evaluation


Date of Evaluation: 10/04/18


Time of Evaluation: 09:00





- Subjective


Subjective: 





PGY-1 Sheryl Arita D.O. Medicine progress note for Dr. Blount 

service:





Patient was seen and examined this morning. She is complaining of L buttock and 

leg pain. Patient has not yet attempted to stand since being admitted. She has 

full bowel and bladder control. Patient states that she has had L sciatica for 

about 1 month. She cannot recall a triggering event. She has seen her PMD and 

has taken Flexeril with minimal relief. She also received a referral to PT but 

has not yet made an appointment. Patient endorses generalized anxiety and panic 

attacks for many years, but she has not taken any medication for over a year. 

She has an appointment with a new psychiatrist next week and does not wish to 

speak with one while inpatient. Patient also endorses smoking marijuana daily. 

She does not work and lives with her  and children. She denies chest 

pain, headache, SOB, fevers or chills.








Objective





- Vital Signs/Intake and Output


Vital Signs (last 24 hours): 


                                        











Temp Pulse Resp BP Pulse Ox


 


 97.6 F   80   22   106/61   98 


 


 10/04/18 04:00  10/04/18 07:00  10/04/18 06:20  10/04/18 06:00  10/04/18 06:20








Intake and Output: 


                                        











 10/04/18 10/04/18





 06:59 18:59


 


Intake Total 750 120


 


Output Total 450 500


 


Balance 300 -380














- Medications


Medications: 


                               Current Medications





Acetaminophen (Tylenol 325mg Tab)  650 mg PO Q6H PRN


   PRN Reason: Pain, Mild (1-3)


   Last Admin: 10/04/18 10:34 Dose:  650 mg


Alprazolam (Xanax)  0.5 mg PO BID PRN; Protocol


   PRN Reason: Anxiety


Cyclobenzaprine HCl (Flexeril)  5 mg PO TID PRN


   PRN Reason: Muscle spasm


Dexamethasone (Decadron)  4 mg PO Q8 MUNA


Famotidine (Pepcid)  20 mg PO 1000,2200 MUNA


   Last Admin: 10/04/18 10:34 Dose:  20 mg


Vancomycin HCl (Vancomycin 1gm)  1 gm in 250 mls @ 167 mls/hr IVPB Q12H MUNA; 

Protocol


   Last Admin: 10/04/18 10:35 Dose:  167 mls/hr











- Labs


Labs: 


                                        





                                 10/04/18 06:00 





                                 10/04/18 06:00 











- Constitutional


Appears: Non-toxic, No Acute Distress





- Head Exam


Head Exam: ATRAUMATIC, NORMAL INSPECTION





- Eye Exam


Eye Exam: EOMI, Normal appearance, PERRL





- ENT Exam


ENT Exam: Mucous Membranes Moist, Normal Exam





- Neck Exam


Neck Exam: Full ROM, Normal Inspection





- Respiratory Exam


Respiratory Exam: Clear to Ausculation Bilateral, NORMAL BREATHING PATTERN





- Cardiovascular Exam


Cardiovascular Exam: REGULAR RHYTHM, +S1, +S2





- GI/Abdominal Exam


GI & Abdominal Exam: Soft, Normal Bowel Sounds





- Rectal Exam


Rectal Exam: Deferred





- Extremities Exam


Extremities Exam: Normal Capillary Refill, Normal Inspection





- Back Exam


Back Exam: NORMAL INSPECTION





- Neurological Exam


Neurological Exam: Alert, Awake, CN II-XII Intact, Oriented x3


Neuro motor strength exam: Left Upper Extremity: 5, Right Upper Extremity: 5, 

Left Lower Extremity: 4, Right Lower Extremity: 5





- Psychiatric Exam


Psychiatric exam: Anxious





- Skin


Skin Exam: Dry, Intact, Normal Color, Warm





Assessment and Plan





- Assessment and Plan (Free Text)


Assessment: 





Patient is a 28 you female with a history of anxiety and L sciatica who presente

d to the ED with LLE pain and numbness with the inability to walk. Patient was 

initially managed in the ICU to r/o cauda equina (patient reported possible 

urinary retention) and significant leukocytosis (31). Patients lumbar MRI was 

negative, she has full bowel/bladder control, and her leukocytosis resolved. 

Therefore, she was downgraded to med/surg for further management of leg pain and

paresthesia.





Plan: 





LLE weakness and paresthesia- 2/2 disc protrusion


   - Lumbar spine MRI: L5-S1 minimal central disc protrusion and radial tear


   - Tylenol 650 mg PO Q6H PRN


   - Flexeril 5 mg PO TID PRN


   - Decadron 4 mg PO Q8H


   - Warm compresses


   - Neurology consulted (Ron)


   - Neurosurgery consulted (Chinedu)


   - PT





Leukocytosis, resolved- 31.5-->9.4


   - LA 1.1, procal 0.55


   - Afebrile


   - CT abd pelvis: Mild periportal edema and pericholecystic fluid. may 

represent fluid overload versus hepatitis. 


   2.3 cm low density nodule is noted in the right adrenal gland consistent with

an adenoma. 


   Several small bilateral renal cysts are present.


   - Abd u/s: negative


   - Vancomycin 1g IV Q12H


   - ID consulted (Clare)





Anxiety- not on meds


   - Patient declined to speak with psychiatrist while inpatient





IVF: not indicated


Diet: regular


GI ppx: Pepcid 20 mg PO BID


VTE ppx:


COde status: full code





Case discussed with attending, Dr. Robins.








<Gerber Robins - Last Filed: 10/05/18 19:20>





Objective





- Vital Signs/Intake and Output


Vital Signs (last 24 hours): 


                                        











Temp Pulse Resp BP Pulse Ox


 


 99 F   108 H  18   100/57 L  97 


 


 10/05/18 14:00  10/05/18 14:00  10/05/18 14:00  10/05/18 14:00  10/05/18 14:00








Intake and Output: 


                                        











 10/05/18 10/06/18





 18:59 06:59


 


Intake Total 660 


 


Balance 660 














- Labs


Labs: 


                                        





                                 10/05/18 06:00 





                                 10/05/18 06:00 











Attending/Attestation





- Attestation


I have personally seen and examined this patient.: Yes


I have fully participated in the care of the patient.: Yes


I have reviewed all pertinent clinical information, including history, physical 

exam and plan: Yes


Notes (Text): 





10/05/18 19:17





attending note;





Patient seen and examined with resident In ICU.


Patient is complaining of left leg numbness and pain.


Not in any acute distress.


Denies any urinary incontinence or retention.


Denies any saddle anesthesia.





Patient is a 28 year old female with a history of anxiety and L sciatica who 

presented to the ED with LLE pain and numbness with the inability to walk. Pa

maxine was initially managed in the ICU to r/o cauda equina (patient reported 

possible urinary retention) and significant leukocytosis (31).


MRI of the lumbosacral spine preliminary report is negative for cardiac equina 

syndrome.


Started on Decadron, Flexeril.


Pain management with Toradol.


PT evaluation requested.


 Neurology evaluation requested.





leukocytosis; with no signs of infection. No fevers or chills. Procalcitonin 

ordered.


continue vancomycin.


Currently no source of infection.





Upon discharge the patient will follow-up with PMD Dr. Cooper.





10/05/18 19:19

## 2018-10-04 NOTE — MRI
Date of service: 



10/03/2018



PROCEDURE:  MR LUMBAR SPINE WITHOUT CONTRAST



HISTORY:

back pain, left leg numbness, leukocytosis,



COMPARISON:

None available. 



TECHNIQUE:

Multiecho multiplanar sequences were performed through the lumbar 

spine without the use of intravenous contrast.



FINDINGS:

Normal lumbar lordosis.



Vertebral body heights are preserved.



Marrow signal unremarkable.



Conus medullaris unremarkable at the level of T12 



Paraspinal soft tissues are unremarkable.



T12-L1:

No disc herniation, spinal canal stenosis or neural foraminal 

narrowing. 



L1-2:

No disc herniation, spinal canal stenosis or neural foraminal 

narrowing. 



L2-3:

No disc herniation, spinal canal stenosis or neural foraminal 

narrowing. 



L3-4:

No disc herniation, spinal canal stenosis or neural foraminal 

narrowing. 



L4-5:

No disc herniation, spinal canal stenosis or neural foraminal 

narrowing. 



L5-S1:

Minimal central disc protrusion and radial tear. 



OTHER FINDINGS:

The report concurs with the preliminary  report 



IMPRESSION:

L5-S1.  Minimal central disc protrusion and radial tear.

## 2018-10-04 NOTE — CT
Date of service: 



10/03/2018



PROCEDURE:  CT Abdomen and Pelvis with contrast



HISTORY:

back pain, fever, vomiting



COMPARISON:

04/05/2014



TECHNIQUE:

Contrast dose: 95 cc of Omni 350



Radiation dose:



Total exam DLP = 353 mGy-cm.



This CT exam was performed using one or more of the following dose 

reduction techniques: Automated exposure control, adjustment of the 

mA and/or kV according to patient size, and/or use of iterative 

reconstruction technique.



FINDINGS:



LOWER THORAX:

Unremarkable. 



LIVER:

Unremarkable. No gross lesion or ductal dilatation. 



GALLBLADDER AND BILE DUCTS:

There is a small amount of pericholecystic fluid.  No obvious stones. 

 Ultrasound may be indicated. 



PANCREAS:

Unremarkable. No gross lesion or ductal dilatation.



SPLEEN:

Unremarkable. 



ADRENALS:

There is an 18 x 26 mm low-density lesion in the right adrenal gland 

consistent with adenoma 



KIDNEYS AND URETERS:

Unremarkable. No hydronephrosis. No solid mass. 



VASCULATURE:

Unremarkable. No aortic aneurysm. 



BOWEL:

Unremarkable. No obstruction. No gross mural thickening. 



APPENDIX:

Normal appendix. 



PERITONEUM:

Unremarkable. No free fluid. No free air. 



LYMPH NODES:

Unremarkable. No enlarged lymph nodes. 



BLADDER:

Unremarkable. 



REPRODUCTIVE:

Bilateral ovarian cysts.  Small amount of fluid in the cul-de-sac 



BONES:

No acute fracture. 



OTHER FINDINGS:

The report concurs with the preliminary report



IMPRESSION:

Minimal pericholecystic fluid.  Possible cholecystitis.  Ultrasound 

may be indicated.

## 2018-10-05 VITALS
OXYGEN SATURATION: 97 % | DIASTOLIC BLOOD PRESSURE: 57 MMHG | RESPIRATION RATE: 18 BRPM | TEMPERATURE: 99 F | SYSTOLIC BLOOD PRESSURE: 100 MMHG | HEART RATE: 108 BPM

## 2018-10-05 LAB
ALBUMIN SERPL-MCNC: 3.8 G/DL (ref 3–4.8)
ALBUMIN/GLOB SERPL: 1.6 {RATIO} (ref 1.1–1.8)
ALT SERPL-CCNC: 32 U/L (ref 7–56)
APPEARANCE UR: CLEAR
AST SERPL-CCNC: 37 U/L (ref 14–36)
BASOPHILS # BLD AUTO: 0 K/MM3 (ref 0–2)
BASOPHILS NFR BLD: 0 % (ref 0–3)
BILIRUB UR-MCNC: NEGATIVE MG/DL
BUN SERPL-MCNC: 13 MG/DL (ref 7–21)
CALCIUM SERPL-MCNC: 9 MG/DL (ref 8.4–10.5)
COLOR UR: YELLOW
EOSINOPHIL # BLD: 0 10*3/UL (ref 0–0.7)
EOSINOPHIL NFR BLD: 0 % (ref 1.5–5)
ERYTHROCYTE [DISTWIDTH] IN BLOOD BY AUTOMATED COUNT: 12.7 % (ref 11.5–14.5)
GFR NON-AFRICAN AMERICAN: > 60
GLUCOSE UR STRIP-MCNC: NEGATIVE MG/DL
GRANULOCYTES # BLD: 9.91 10*3/UL (ref 1.4–6.5)
GRANULOCYTES NFR BLD: 83.1 % (ref 50–68)
HGB BLD-MCNC: 12.1 G/DL (ref 12–16)
LEUKOCYTE ESTERASE UR-ACNC: NEGATIVE LEU/UL
LYMPHOCYTES # BLD: 1.4 10*3/UL (ref 1.2–3.4)
LYMPHOCYTES NFR BLD AUTO: 11.8 % (ref 22–35)
MCH RBC QN AUTO: 29.2 PG (ref 25–35)
MCHC RBC AUTO-ENTMCNC: 34.1 G/DL (ref 31–37)
MCV RBC AUTO: 85.7 FL (ref 80–105)
MONOCYTES # BLD AUTO: 0.6 10*3/UL (ref 0.1–0.6)
MONOCYTES NFR BLD: 5.1 % (ref 1–6)
PH UR STRIP: 6.5 [PH] (ref 4.7–8)
PLATELET # BLD: 260 10^3/UL (ref 120–450)
PMV BLD AUTO: 10.6 FL (ref 7–11)
PROT UR STRIP-MCNC: NEGATIVE MG/DL
RBC # BLD AUTO: 4.14 10^6/UL (ref 3.5–6.1)
RBC # UR STRIP: NEGATIVE /UL
SP GR UR STRIP: 1.02 (ref 1–1.03)
UROBILINOGEN UR STRIP-ACNC: 1 E.U./DL
WBC # BLD AUTO: 11.9 10^3/UL (ref 4.5–11)

## 2018-10-05 RX ADMIN — VANCOMYCIN HYDROCHLORIDE SCH MLS/HR: 1 INJECTION, POWDER, LYOPHILIZED, FOR SOLUTION INTRAVENOUS at 08:20

## 2018-10-05 NOTE — CON
DATE:  10/05/2018



REASON FOR CONSULTATION:  Left leg numbness.



HISTORY OF PRESENT ILLNESS:  The patient is a 28-year-old young woman who

states that in September she had an episode of sciatic pain in her left

leg.  It went from her left buttock down the back of her thigh too, but not

below, her left knee.  She states she has had this in the past, occurring

when she was pregnant with each of her two children.  She states this

happened without any antecedent trauma or any other straining she can think

of.  However, about 3 days ago, her left leg gave out and she states the

whole leg became numb and weak.  She states the leg was tingly in her thigh

somewhat, but she could not feel anything from her knee down to the toes in

the entire left side.  She has never had any symptoms essentially on the

right side outside of occasional buttock discomfort.  No loss of bowel or

bladder control.  She was brought to the hospital.  There were some

question of urinary retention, but she voided in the emergency room when

she came in.  She states that it has improved some to the point that now

she has some movement in her lower left leg at this time, but it still does

not feel normal.  Still little tingly, especially, in the bottom of her

foot.



PAST MEDICAL HISTORY:  She denies any significant past medical history.



MEDICATIONS:  Are as listed on the chart.



ALLERGIES:  SHE DENIES ANY ALLERGIES TO MEDICATIONS OUTSIDE OF CEFACLOR AS

A CHILD, BUT SHE HAS TAKEN AMOXICILLIN.



PAST SURGICAL HISTORY:  Significant she states for  kidney stones and

placement of a stent few years ago when she had an appendectomy as a child.



FAMILY HISTORY:  She does mention that her mother has MS as well as

fibromyalgia.



SOCIAL HISTORY:  She lives with her .  She has 2 children as

mentioned above.  She smokes5 to 6 cigarettes a day which she has been

doing for 8 years.  She states she uses marijuana every other day.



PHYSICAL EXAMINATION:

NEUROLOGIC:  She has no straight-leg raising noted on the right leg up to

90 degrees.  On the left side, at about 80 to 90 degrees, she complains of

some discomfort in the anterior left thigh.  She is actively moving both

lower extremities and to light touch, she states each side feels the same

above the medial ankle.  However, it feels very tingly in the first web

space of her left foot compared to the right and somewhat tingly in the

lateral ankle on the left compared to the right.  Did not really get any

clonus.  She is very ticklish, so it is difficult to evaluate Babinski's,

although, they appeared to be neutral, may be little upgoing on the right

side and she is hypersensitive on the left side.  Good distal pulses.  She

has some weakness where she cannot fully dorsiflex as much on the left

ankle as she can on the right and it is weaker on the left side for both

the anterior tib and the EHL.  She has weak hip flexors on the left side as

well compared to the right.  Quad seen to be pretty good.



She did an MRI of her lumbar spine which shows a small central disc

protrusion with annular tear at L5-S1.  Otherwise, the MRI looks fine.



IMPRESSION:  Is that of some type of left lower extremity neuropathy. 

There is nothing significant on lumbar MRI that would account for

description of all of her symptoms.  Certainly, with the mother having

multiple sclerosis, that may be part of differential diagnoses and an MRI

of the brain with and without contrast has been ordered.  She does state

things are improving, but again at this point, there is nothing

neurosurgically indicated for any intervention, so we will follow along

with the patient if as needed.  Please recall.



Thank you for allowing me to participate in the care of your patient.







__________________________________________

Jayden Che MD









DD:  10/05/2018 11:37:20

DT:  10/05/2018 12:09:32

Job # [id number]

## 2018-10-05 NOTE — CP.PCM.DIS
<Sheryl Arita - Last Filed: 10/05/18 16:09>





Provider





- Provider


Date of Admission: 


10/03/18 19:02





Attending physician: 


Gerber Robins MD





Primary care physician: 


Jasmine Cooper MD





Consults: 





neurology


neurosurgery


ID


ICU





Time Spent in preparation of Discharge (in minutes): 45





Diagnosis





- Discharge Diagnosis


(1) Herniation of intervertebral disc between L5 and S1


Status: Chronic   Priority: High   





(2) Left sciatic nerve pain


Status: Acute   Priority: High   





(3) Weakness of left lower extremity


Status: Acute   Priority: High   





(4) Left leg numbness


Status: Acute   Priority: High   





Hospital Course





- Lab Results


Lab Results: 


                                  Micro Results





10/03/18 21:32   Naris   MRSA Culture (Admit) - Final


                            MRSA NOT DETECTED


10/03/18 16:55   Blood   Blood Culture - Preliminary


                            NO GROWTH AFTER 24 HOURS


10/03/18 16:40   Blood   Blood Culture - Preliminary


                            NO GROWTH AFTER 24 HOURS





                             Most Recent Lab Values











WBC  11.9 10^3/ul (4.5-11.0)  H D 10/05/18  06:00    


 


RBC  4.14 10^6/uL (3.5-6.1)   10/05/18  06:00    


 


Hgb  12.1 g/dL (12.0-16.0)   10/05/18  06:00    


 


Hct  35.5 % (36.0-48.0)  L  10/05/18  06:00    


 


MCV  85.7 fl (80.0-105.0)   10/05/18  06:00    


 


MCH  29.2 pg (25.0-35.0)   10/05/18  06:00    


 


MCHC  34.1 g/dl (31.0-37.0)   10/05/18  06:00    


 


RDW  12.7 % (11.5-14.5)   10/05/18  06:00    


 


Plt Count  260 10^3/uL (120.0-450.0)   10/05/18  06:00    


 


MPV  10.6 fl (7.0-11.0)   10/05/18  06:00    


 


Gran %  83.1 % (50.0-68.0)  H  10/05/18  06:00    


 


Lymph % (Auto)  11.8 % (22.0-35.0)  L  10/05/18  06:00    


 


Mono % (Auto)  5.1 % (1.0-6.0)   10/05/18  06:00    


 


Eos % (Auto)  0.0 % (1.5-5.0)  L  10/05/18  06:00    


 


Baso % (Auto)  0.0 % (0.0-3.0)   10/05/18  06:00    


 


Gran #  9.91  (1.4-6.5)  H  10/05/18  06:00    


 


Lymph # (Auto)  1.4  (1.2-3.4)   10/05/18  06:00    


 


Mono # (Auto)  0.6  (0.1-0.6)   10/05/18  06:00    


 


Eos # (Auto)  0.0  (0.0-0.7)   10/05/18  06:00    


 


Baso # (Auto)  0.00 K/mm3 (0.0-2.0)   10/05/18  06:00    


 


Neutrophils % (Manual)  84 % (50.0-70.0)  H  10/03/18  15:45    


 


Lymphocytes % (Manual)  13 % (22.0-35.0)  L  10/03/18  15:45    


 


Monocytes % (Manual)  3 % (1.0-6.0)   10/03/18  15:45    


 


Platelet Evaluation  Normal  (NORMAL)   10/03/18  15:45    


 


ESR  3 mm/hr (0.0-20.0)   10/04/18  12:10    


 


pO2  95 mm/Hg (30-55)  H  10/03/18  16:40    


 


VBG pH  7.29  (7.32-7.43)  L  10/03/18  16:40    


 


VBG pCO2  54.0  (40-60)   10/03/18  16:40    


 


VBG HCO3  26.0 mmol/l (21-28)   10/03/18  16:40    


 


VBG Total CO2  27.7 mmol.L (22-28)   10/03/18  16:40    


 


VBG O2 Sat (Calc)  99.2 % (40-65)  H  10/03/18  16:40    


 


VBG Base Excess  -1.4 mmol/L (0.0-2.0)  L  10/03/18  16:40    


 


VBG Potassium  3.5 mmol/L (3.6-5.2)  L  10/03/18  16:40    


 


Sodium  141.0 mmol/L (132-148)   10/03/18  16:40    


 


Chloride  108.0 mmol/L ()  H  10/03/18  16:40    


 


Glucose  71 mg/dl ()   10/03/18  16:40    


 


Lactate  1.1 mmol/L (0.7-2.1)   10/03/18  16:40    


 


FiO2  21.0 %  10/03/18  16:40    


 


Sodium  136 mmol/L (132-148)   10/05/18  06:00    


 


Potassium  4.2 mmol/L (3.6-5.0)   10/05/18  06:00    


 


Chloride  107 mmol/L ()   10/05/18  06:00    


 


Carbon Dioxide  22 mmol/L (21-33)   10/05/18  06:00    


 


Anion Gap  11  (10-20)   10/05/18  06:00    


 


BUN  13 mg/dL (7-21)   10/05/18  06:00    


 


Creatinine  0.5 mg/dl (0.7-1.2)  L  10/05/18  06:00    


 


Est GFR ( Amer)  > 60   10/05/18  06:00    


 


Est GFR (Non-Af Amer)  > 60   10/05/18  06:00    


 


Random Glucose  110 mg/dL ()   10/05/18  06:00    


 


Hemoglobin A1c  5.2 % (4.2-6.5)   10/04/18  12:10    


 


Calcium  9.0 mg/dL (8.4-10.5)   10/05/18  06:00    


 


Phosphorus  3.2 mg/dL (2.5-4.5)   10/05/18  06:00    


 


Magnesium  1.9 mg/dL (1.7-2.2)   10/05/18  06:00    


 


Total Bilirubin  0.2 mg/dL (0.2-1.3)   10/05/18  06:00    


 


AST  37 U/L (14-36)  H D 10/05/18  06:00    


 


ALT  32 U/L (7-56)   10/05/18  06:00    


 


Alkaline Phosphatase  43 U/L ()   10/05/18  06:00    


 


C-Reactive Protein  9.90 mg/L (0.0-9.9)   10/04/18  12:10    


 


Total Protein  6.2 g/dL (5.8-8.3)   10/05/18  06:00    


 


Albumin  3.8 g/dL (3.0-4.8)   10/05/18  06:00    


 


Globulin  2.4 gm/dL  10/05/18  06:00    


 


Albumin/Globulin Ratio  1.6  (1.1-1.8)   10/05/18  06:00    


 


Procalcitonin  0.55 NG/ML (0.19-0.49)  H  10/04/18  06:00    


 


Beta HCG, Quant  < 2.39 mIU/mL (0-6.15)   10/03/18  15:45    


 


Venous Blood Potassium  3.5 mmol/L (3.6-5.2)  L  10/03/18  16:40    


 


Urine Color  Yellow  (YELLOW)   10/05/18  10:31    


 


Urine Appearance  Clear  (CLEAR)   10/05/18  10:31    


 


Urine pH  6.5  (4.7-8.0)   10/05/18  10:31    


 


Ur Specific Gravity  1.025  (1.005-1.035)   10/05/18  10:31    


 


Urine Protein  Negative mg/dL (<30 mg/dL)   10/05/18  10:31    


 


Urine Glucose (UA)  Negative mg/dL (NEGATIVE)   10/05/18  10:31    


 


Urine Ketones  40 mg/dL (NEGATIVE)  H  10/05/18  10:31    


 


Urine Blood  Negative  (NEGATIVE)   10/05/18  10:31    


 


Urine Nitrate  Negative  (NEGATIVE)   10/05/18  10:31    


 


Urine Bilirubin  Negative  (NEGATIVE)   10/05/18  10:31    


 


Urine Urobilinogen  1.0 E.U./dL (<1 E.U./dL)  H  10/05/18  10:31    


 


Ur Leukocyte Esterase  Negative Evangelina/uL (NEGATIVE)   10/05/18  10:31    


 


Urine RBC  Negative /hpf (0-2)   10/03/18  16:55    


 


Urine WBC  0 - 2 /hpf (0-6)   10/03/18  16:55    


 


Urine Bacteria  Many  (NEG)   10/03/18  16:55    


 


Salicylates  < 1 mg/dL (2.0-20.0)  L  10/03/18  15:45    


 


Urine Opiates Screen  Negative  (NEGATIVE)   10/03/18  16:55    


 


Urine Methadone Screen  Negative  (NEGATIVE)   10/03/18  16:55    


 


Acetaminophen  < 10.0 ug/ml (10.0-20.0)  L  10/03/18  15:45    


 


Ur Barbiturates Screen  Negative  (NEGATIVE)   10/03/18  16:55    


 


Ur Phencyclidine Scrn  Negative  (NEGATIVE)   10/03/18  16:55    


 


Ur Amphetamines Screen  Negative  (NEGATIVE)   10/03/18  16:55    


 


U Benzodiazepines Scrn  Negative  (NEGATIVE)   10/03/18  16:55    


 


U Oth Cocaine Metabols  Negative  (NEGATIVE)   10/03/18  16:55    


 


U Cannabinoids Screen  Positive  (NEGATIVE)  H  10/03/18  16:55    


 


Alcohol, Quantitative  < 10 mg/dL (0-10)   10/03/18  15:45    


 


NITESH Screen  Negative  (Negative)   10/04/18  12:10    


 


NITESH Titer  TEST NOT PERFORMED   10/04/18  12:10    


 


NITESH Titer 2  TEST NOT PERFORMED   10/04/18  12:10    


 


NITESH Pattern  TEST NOT PERFORMED   10/04/18  12:10    


 


NITESH Pattern 2  TEST NOT PERFORMED   10/04/18  12:10    


 


Hepatitis A IgM Ab  Negative  (NEGATIVE)   10/04/18  12:10    


 


Hep Bs Antigen  Negative  (NEGATIVE)   10/04/18  12:10    


 


Hep B Core IgM Ab  Negative  (NEGATIVE)   10/04/18  12:10    


 


Hepatitis C Antibody  Negative  (NEGATIVE)   10/04/18  12:10    


 


HIV 1&2 Ag/Ab, 4th Gen  Nonreactive  (Nonreactive)   10/04/18  12:10    














- Hospital Course


Hospital Course: 





Gisell Turk is a 28 year old female with PMH anxiety who presented with 

left leg numbness and weakness that started this afternoon. Patient states that 

she was going to give her children a bath when she felt that her left leg "give 

out" become numb and weak and she fell on the floor. Then she felt very anxious 

and was unable to move her left leg. Patient also reports left sided sciatica 

pain for the past 3 years since her pregnancy. However, it has gotten worse this

past month. Patient was supposed to get physical therapy but this event occurred

before her appointment.





The patient was diagnosed with L5-S1 mild disc protrusion and radial tear which 

was found on Lumbar MRI. Abdominal CT results showed no evidence of compression 

of the spinal cord however. Neurosurgery was consulted and after examining the 

patient, Dr. Che advised follow up as an outpatient. Neurology was consulted

as well and advised Brain MRI due to family history of MS (mother) which will be

done as an outpatient. She received dexamethasone and flexeril. She reported 

tingling, burning pain on the sole of her left foot but overall improvement of 

motor and sensory symptoms of her left leg. Patient was evaluated by PT.


On admission, patient presented with leukocytosis with WBC of 31.5. Infectious 

disease was consulted and advised to start IV antibiotics. However leukocytosis 

trended downwards from 31.5 to 9.4 to 11.9. She will get an MRI of L SI joint as

outpatient. Her AST levels were elevated at 50 and Hepatitis panel was performed

which was negative for Hepatitis A, B and C. HIV testing was negative as well. 

NITESH panel was performed which was also negative.





At discharge patient is ambulating on crutches. Vitals and labs are stable. Pain

was much improved.








Discharge Exam





- Head Exam


Head Exam: ATRAUMATIC, NORMOCEPHALIC





- Eye Exam


Eye Exam: EOMI, Normal appearance, PERRL





- ENT Exam


ENT Exam: Mucous Membranes Moist, Normal Exam





- Neck Exam


Neck exam: Full Rom, Normal Inspection





- Respiratory Exam


Respiratory Exam: Clear to PA & Lateral, NORMAL BREATHING PATTERN, UNREMARKABLE





- Cardiovascular Exam


Cardiovascular Exam: REGULAR RHYTHM, +S1, +S2





- GI/Abdominal Exam


GI & Abdominal Exam: Normal Bowel Sounds, Soft, Unremarkable





- Rectal Exam


Rectal Exam: Deferred





- Extremities Exam


Extremities exam: full ROM, normal inspection, pedal pulses present





- Back Exam


Back exam: NORMAL INSPECTION, tenderness (L lumbar)





- Neurological Exam


Neurological exam: Abnormal Gait (with crutches), Alert, CN II-XII Intact, 

Oriented x3, Reflexes Normal





- Skin


Skin Exam: Dry, Intact, Normal Color, Warm





Discharge Plan





- Discharge Medications


Prescriptions: 


Cyclobenzaprine [Flexeril] 5 mg PO Q8 #10 tab


Dexamethasone [Decadron] 2 mg PO BID #4 tab





- Follow Up Plan


Condition: IMPROVED


Disposition: HOME/ ROUTINE


Patient education suggested?: Yes


Instructions:  Herniated Disc (DC)


Additional Instructions: 


Please follow-up with your primary care provider at the [facility] 

within 3-5 days of discharge.





Please obtain an MRI of the brain as an outpatient. You primary care provider 

can refer you to a neurologist if needed based on the results.


Please obtain an MRI of your sacroiliac joint (low left back) as an outpatient.





Take prescriptions as instructed. You may obtain refills from your primary care 

doctor if necessary.





You are being given a referral for outpatient physical therapy.





If symptoms return, please present to the nearest emergency room.


Referrals: 


Jayden Che MD [Staff Provider] - 


Jasmine Cooper MD [Primary Care Provider] - 


Byron Maxwell MD [Staff Provider] - 





<Gerber Robins - Last Filed: 10/05/18 19:23>





Provider





- Provider


Date of Admission: 


10/03/18 19:02





Attending physician: 


Gerber Robins MD





Primary care physician: 


Jasmine Cooper MD








Hospital Course





- Lab Results


Lab Results: 


                                  Micro Results





10/03/18 16:55   Blood   Blood Culture - Preliminary


                            NO GROWTH AFTER 48 HOURS


10/03/18 16:40   Blood   Blood Culture - Preliminary


                            NO GROWTH AFTER 48 HOURS


10/03/18 22:47   Urine   Urine Culture - Preliminary


                            Gram Negative Sloan


10/03/18 21:32   Naris   MRSA Culture (Admit) - Final


                            MRSA NOT DETECTED





                             Most Recent Lab Values











WBC  11.9 10^3/ul (4.5-11.0)  H D 10/05/18  06:00    


 


RBC  4.14 10^6/uL (3.5-6.1)   10/05/18  06:00    


 


Hgb  12.1 g/dL (12.0-16.0)   10/05/18  06:00    


 


Hct  35.5 % (36.0-48.0)  L  10/05/18  06:00    


 


MCV  85.7 fl (80.0-105.0)   10/05/18  06:00    


 


MCH  29.2 pg (25.0-35.0)   10/05/18  06:00    


 


MCHC  34.1 g/dl (31.0-37.0)   10/05/18  06:00    


 


RDW  12.7 % (11.5-14.5)   10/05/18  06:00    


 


Plt Count  260 10^3/uL (120.0-450.0)   10/05/18  06:00    


 


MPV  10.6 fl (7.0-11.0)   10/05/18  06:00    


 


Gran %  83.1 % (50.0-68.0)  H  10/05/18  06:00    


 


Lymph % (Auto)  11.8 % (22.0-35.0)  L  10/05/18  06:00    


 


Mono % (Auto)  5.1 % (1.0-6.0)   10/05/18  06:00    


 


Eos % (Auto)  0.0 % (1.5-5.0)  L  10/05/18  06:00    


 


Baso % (Auto)  0.0 % (0.0-3.0)   10/05/18  06:00    


 


Gran #  9.91  (1.4-6.5)  H  10/05/18  06:00    


 


Lymph # (Auto)  1.4  (1.2-3.4)   10/05/18  06:00    


 


Mono # (Auto)  0.6  (0.1-0.6)   10/05/18  06:00    


 


Eos # (Auto)  0.0  (0.0-0.7)   10/05/18  06:00    


 


Baso # (Auto)  0.00 K/mm3 (0.0-2.0)   10/05/18  06:00    


 


Neutrophils % (Manual)  84 % (50.0-70.0)  H  10/03/18  15:45    


 


Lymphocytes % (Manual)  13 % (22.0-35.0)  L  10/03/18  15:45    


 


Monocytes % (Manual)  3 % (1.0-6.0)   10/03/18  15:45    


 


Platelet Evaluation  Normal  (NORMAL)   10/03/18  15:45    


 


ESR  3 mm/hr (0.0-20.0)   10/04/18  12:10    


 


pO2  95 mm/Hg (30-55)  H  10/03/18  16:40    


 


VBG pH  7.29  (7.32-7.43)  L  10/03/18  16:40    


 


VBG pCO2  54.0  (40-60)   10/03/18  16:40    


 


VBG HCO3  26.0 mmol/l (21-28)   10/03/18  16:40    


 


VBG Total CO2  27.7 mmol.L (22-28)   10/03/18  16:40    


 


VBG O2 Sat (Calc)  99.2 % (40-65)  H  10/03/18  16:40    


 


VBG Base Excess  -1.4 mmol/L (0.0-2.0)  L  10/03/18  16:40    


 


VBG Potassium  3.5 mmol/L (3.6-5.2)  L  10/03/18  16:40    


 


Sodium  141.0 mmol/L (132-148)   10/03/18  16:40    


 


Chloride  108.0 mmol/L ()  H  10/03/18  16:40    


 


Glucose  71 mg/dl ()   10/03/18  16:40    


 


Lactate  1.1 mmol/L (0.7-2.1)   10/03/18  16:40    


 


FiO2  21.0 %  10/03/18  16:40    


 


Sodium  136 mmol/L (132-148)   10/05/18  06:00    


 


Potassium  4.2 mmol/L (3.6-5.0)   10/05/18  06:00    


 


Chloride  107 mmol/L ()   10/05/18  06:00    


 


Carbon Dioxide  22 mmol/L (21-33)   10/05/18  06:00    


 


Anion Gap  11  (10-20)   10/05/18  06:00    


 


BUN  13 mg/dL (7-21)   10/05/18  06:00    


 


Creatinine  0.5 mg/dl (0.7-1.2)  L  10/05/18  06:00    


 


Est GFR ( Amer)  > 60   10/05/18  06:00    


 


Est GFR (Non-Af Amer)  > 60   10/05/18  06:00    


 


Random Glucose  110 mg/dL ()   10/05/18  06:00    


 


Hemoglobin A1c  5.2 % (4.2-6.5)   10/04/18  12:10    


 


Calcium  9.0 mg/dL (8.4-10.5)   10/05/18  06:00    


 


Phosphorus  3.2 mg/dL (2.5-4.5)   10/05/18  06:00    


 


Magnesium  1.9 mg/dL (1.7-2.2)   10/05/18  06:00    


 


Total Bilirubin  0.2 mg/dL (0.2-1.3)   10/05/18  06:00    


 


AST  37 U/L (14-36)  H D 10/05/18  06:00    


 


ALT  32 U/L (7-56)   10/05/18  06:00    


 


Alkaline Phosphatase  43 U/L ()   10/05/18  06:00    


 


C-Reactive Protein  9.90 mg/L (0.0-9.9)   10/04/18  12:10    


 


Total Protein  6.2 g/dL (5.8-8.3)   10/05/18  06:00    


 


Albumin  3.8 g/dL (3.0-4.8)   10/05/18  06:00    


 


Globulin  2.4 gm/dL  10/05/18  06:00    


 


Albumin/Globulin Ratio  1.6  (1.1-1.8)   10/05/18  06:00    


 


Procalcitonin  0.55 NG/ML (0.19-0.49)  H  10/04/18  06:00    


 


Beta HCG, Quant  < 2.39 mIU/mL (0-6.15)   10/03/18  15:45    


 


Venous Blood Potassium  3.5 mmol/L (3.6-5.2)  L  10/03/18  16:40    


 


Urine Color  Yellow  (YELLOW)   10/05/18  10:31    


 


Urine Appearance  Clear  (CLEAR)   10/05/18  10:31    


 


Urine pH  6.5  (4.7-8.0)   10/05/18  10:31    


 


Ur Specific Gravity  1.025  (1.005-1.035)   10/05/18  10:31    


 


Urine Protein  Negative mg/dL (<30 mg/dL)   10/05/18  10:31    


 


Urine Glucose (UA)  Negative mg/dL (NEGATIVE)   10/05/18  10:31    


 


Urine Ketones  40 mg/dL (NEGATIVE)  H  10/05/18  10:31    


 


Urine Blood  Negative  (NEGATIVE)   10/05/18  10:31    


 


Urine Nitrate  Negative  (NEGATIVE)   10/05/18  10:31    


 


Urine Bilirubin  Negative  (NEGATIVE)   10/05/18  10:31    


 


Urine Urobilinogen  1.0 E.U./dL (<1 E.U./dL)  H  10/05/18  10:31    


 


Ur Leukocyte Esterase  Negative Evangelina/uL (NEGATIVE)   10/05/18  10:31    


 


Urine RBC  Negative /hpf (0-2)   10/03/18  16:55    


 


Urine WBC  0 - 2 /hpf (0-6)   10/03/18  16:55    


 


Urine Bacteria  Many  (NEG)   10/03/18  16:55    


 


Salicylates  < 1 mg/dL (2.0-20.0)  L  10/03/18  15:45    


 


Urine Opiates Screen  Negative  (NEGATIVE)   10/03/18  16:55    


 


Urine Methadone Screen  Negative  (NEGATIVE)   10/03/18  16:55    


 


Acetaminophen  < 10.0 ug/ml (10.0-20.0)  L  10/03/18  15:45    


 


Ur Barbiturates Screen  Negative  (NEGATIVE)   10/03/18  16:55    


 


Ur Phencyclidine Scrn  Negative  (NEGATIVE)   10/03/18  16:55    


 


Ur Amphetamines Screen  Negative  (NEGATIVE)   10/03/18  16:55    


 


U Benzodiazepines Scrn  Negative  (NEGATIVE)   10/03/18  16:55    


 


U Oth Cocaine Metabols  Negative  (NEGATIVE)   10/03/18  16:55    


 


U Cannabinoids Screen  Positive  (NEGATIVE)  H  10/03/18  16:55    


 


Alcohol, Quantitative  < 10 mg/dL (0-10)   10/03/18  15:45    


 


NITESH Screen  Negative  (Negative)   10/04/18  12:10    


 


NITESH Titer  TEST NOT PERFORMED   10/04/18  12:10    


 


NITESH Titer 2  TEST NOT PERFORMED   10/04/18  12:10    


 


NITESH Pattern  TEST NOT PERFORMED   10/04/18  12:10    


 


NITESH Pattern 2  TEST NOT PERFORMED   10/04/18  12:10    


 


Hepatitis A IgM Ab  Negative  (NEGATIVE)   10/04/18  12:10    


 


Hep Bs Antigen  Negative  (NEGATIVE)   10/04/18  12:10    


 


Hep B Core IgM Ab  Negative  (NEGATIVE)   10/04/18  12:10    


 


Hepatitis C Antibody  Negative  (NEGATIVE)   10/04/18  12:10    


 


HIV 1&2 Ag/Ab, 4th Gen  Nonreactive  (Nonreactive)   10/04/18  12:10    














Attending/Attestation





- Attestation


I have personally seen and examined this patient.: Yes


I have fully participated in the care of the patient.: Yes


I have reviewed all pertinent clinical information, including history, physical 

exam and plan: Yes


Notes (Text): 





10/05/18 19:20





attending note;





Patient seen and examined with resident.


neurosurgery attending by the bedside.


Physical examination completed.


Left leg numbness improved. Pain improved.


 No foot drop noted.


Denies any urinary incontinence or retention.


Denies any saddle anesthesia.





Patient is a 28 year old female with a history of anxiety and L sciatica who 

presented to the ED with LLE pain and numbness with the inability to walk. 

Patient was initially managed in the ICU to r/o cauda equina (patient reported 

possible urinary retention) and significant leukocytosis (31).


MRI of the lumbosacral spine showed Central disc protrusion at L5 and S1 with 

radial tear.


Case discussed with neurosurgery in detail.


Outpatient follow-up.





Started on Decadron, Flexeril.


Pain management with Toradol.


PT evaluation Appreciated.


 Neurology evaluation appreciated.








leukocytosis; resolved. with no signs of infection. No fevers or chills. 

Procalcitonin Is negative.


Antibiotics discontinued.





Blood culture, urine culture is negative so far.


MRI of the sacroiliac joint ordered.


patient denied any tenderness and wanted to  go home.





Advised to follow-up with PMD in 3 days.


Outpatient sacroiliac joint MRI recommended.


Neurology follow-up advised.





Upon discharge the patient will follow-up with PMD Dr. Cooper.

## 2018-10-05 NOTE — CP.PCM.PN
Subjective





- Date & Time of Evaluation


Date of Evaluation: 10/05/18


Time of Evaluation: 09:30





- Subjective


Subjective: 





No fevers, not in distress, afebrile, back pain is slightly improved.





Objective





- Vital Signs/Intake and Output


Vital Signs (last 24 hours): 


                                        











Temp Pulse Resp BP Pulse Ox


 


 99 F   108 H  18   100/57 L  97 


 


 10/05/18 14:00  10/05/18 14:00  10/05/18 14:00  10/05/18 14:00  10/05/18 14:00








Intake and Output: 


                                        











 10/05/18 10/05/18





 06:59 18:59


 


Intake Total 1340 660


 


Balance 1340 660














- Labs


Labs: 


                                        





                                 10/05/18 06:00 





                                 10/05/18 06:00 











- Constitutional


Appears: No Acute Distress, Chronically Ill





- Head Exam


Head Exam: NORMAL INSPECTION





- Neck Exam


Neck Exam: absent: Meningismus





- Respiratory Exam


Respiratory Exam: absent: Rales





- Cardiovascular Exam


Cardiovascular Exam: +S1, +S2





- GI/Abdominal Exam


GI & Abdominal Exam: Soft.  absent: Tenderness





Assessment and Plan





- Assessment and Plan (Free Text)


Plan: 





Assessment


SIRS with no obvious evidence of infection or sepsis


history of UTI


history of kidney stones





Plan


follow up culture results - continue to monitor off antibiotics


may need vasculitis work up 


WBC count has significantly decreased


would recommend MRI of the sacroiliac joint

## 2018-10-05 NOTE — CON
DATE:  10/04/2018



HISTORY OF PRESENT ILLNESS:  The patient is 28-year-old  female

with reported history of depression and anxiety.  The patient was admitted

on the medical site for evaluation of numbness and weakness in her left

leg.  Psych consult was called because the patient has history of anxiety

and depression.  The patient was seen and examined.  The patient reported

that initially she started to feel that her leg is numb and she most likely

lost her consciousness and that is why she ended up in the hospital.  The

patient reported that her depression is under control.  The patient denied

that she feels hopeless or helpless.  The patient also denied that she

feels depressed or suicidal.  The patient is concerned about her medical

condition, but does not about _____ condition.  The patient reported that

she has outpatient psychiatrist and the patient reported that she is not

concerned about her psychiatric illness as of now.  The patient adamantly

denied thoughts of harming herself or others.  Denied hearing voices,

denied seeing things.



VITAL SIGNS:  Seems to be stable.  Temperature 98.6, pulse is 92, blood

pressure 113/63, respiration 27, oxygen saturation is 98.



MEDICATIONS:  Reviewed.  Tylenol, Flexeril, dexamethasone, Pepcid, and

vancomycin.



LABORATORY DATA:  Reviewed.  WBC cells are 31.5, today is trending down. 

Chemistry reviewed.  Toxicology reviewed, cannabis positive.  Serology is

negative.



MENTAL STATUS EXAM:  The patient presented to be alert, oriented, pleasant,

cooperative.  Fair eye contact.  Speech was normal rate, tone, quality and

quantity.  Mood described "I am concerned about that numbness".  Affect was

constricted.  Thought process coherent and goal directed.  Thought content,

the patient denied visual, auditory, tactile hallucinations.  Denied

paranoid ideation.  The patient denied thoughts of harming herself or

others.  The patient does not present to be psychotic.  The patient's

insight and judgment seems to be well impulse controlled.



IMPRESSION:  As per history, depression and anxiety.  The patient does not

have history of suicidal attempts.  The patient does not have history of

being admitted to the psychiatric inpatient unit.



PLAN:  This writer provided the patient with outpatient services. 

Neurological problems need to be ruled out such as MS. Dr. Velasquez,

neurologist is on board.  In regards of psychiatric illnesses, the

patient's anxiety was related to the numbness.  The patient denied that she

feels depressed and anxious.  The patient has two young kids who is under

care of her sister-in-law.  This writer will sign off.  Should you have any

questions give me a call back.  Thank you very much for letting me

participate in care of your patient.





__________________________________________

Roxanne Karimi MD





DD:  10/04/2018 17:58:29

DT:  10/04/2018 18:24:14

Job # [id number]